# Patient Record
Sex: FEMALE | Race: BLACK OR AFRICAN AMERICAN | NOT HISPANIC OR LATINO | Employment: UNEMPLOYED | URBAN - NONMETROPOLITAN AREA
[De-identification: names, ages, dates, MRNs, and addresses within clinical notes are randomized per-mention and may not be internally consistent; named-entity substitution may affect disease eponyms.]

---

## 2017-04-05 ENCOUNTER — OFFICE VISIT (OUTPATIENT)
Dept: SURGERY | Facility: CLINIC | Age: 32
End: 2017-04-05

## 2017-04-05 VITALS
BODY MASS INDEX: 51.91 KG/M2 | RESPIRATION RATE: 18 BRPM | DIASTOLIC BLOOD PRESSURE: 100 MMHG | HEIGHT: 63 IN | TEMPERATURE: 99.1 F | WEIGHT: 293 LBS | SYSTOLIC BLOOD PRESSURE: 160 MMHG | HEART RATE: 102 BPM

## 2017-04-05 DIAGNOSIS — K80.20 GALLSTONES: Primary | ICD-10-CM

## 2017-04-05 PROCEDURE — 99203 OFFICE O/P NEW LOW 30 MIN: CPT | Performed by: SURGERY

## 2017-04-05 RX ORDER — SERTRALINE HYDROCHLORIDE 25 MG/1
50 TABLET, FILM COATED ORAL DAILY
COMMUNITY
End: 2017-05-04 | Stop reason: SDUPTHER

## 2017-04-05 RX ORDER — BUSPIRONE HYDROCHLORIDE 10 MG/1
10 TABLET ORAL 3 TIMES DAILY
COMMUNITY

## 2017-04-05 RX ORDER — GABAPENTIN 300 MG/1
300 CAPSULE ORAL 3 TIMES DAILY
COMMUNITY
End: 2017-04-10

## 2017-04-05 RX ORDER — CYCLOBENZAPRINE HCL 5 MG
10 TABLET ORAL 3 TIMES DAILY PRN
COMMUNITY
End: 2017-05-04

## 2017-04-05 RX ORDER — HEPARIN SODIUM 5000 [USP'U]/ML
5000 INJECTION, SOLUTION INTRAVENOUS; SUBCUTANEOUS ONCE
Status: CANCELLED | OUTPATIENT
Start: 2017-04-05 | End: 2017-04-05

## 2017-04-05 RX ORDER — NORGESTIMATE AND ETHINYL ESTRADIOL 0.25-0.035
1 KIT ORAL DAILY
COMMUNITY
End: 2021-08-12

## 2017-04-05 RX ORDER — SODIUM CHLORIDE 9 MG/ML
50 INJECTION, SOLUTION INTRAVENOUS CONTINUOUS
Status: CANCELLED | OUTPATIENT
Start: 2017-04-05

## 2017-04-05 RX ORDER — SODIUM CHLORIDE 0.9 % (FLUSH) 0.9 %
1-10 SYRINGE (ML) INJECTION AS NEEDED
Status: CANCELLED | OUTPATIENT
Start: 2017-04-05

## 2017-04-05 RX ORDER — LISINOPRIL AND HYDROCHLOROTHIAZIDE 12.5; 1 MG/1; MG/1
1 TABLET ORAL DAILY
Status: ON HOLD | COMMUNITY
End: 2017-04-20

## 2017-04-05 NOTE — PROGRESS NOTES
Subjective   Lynda Biswas is a 31 y.o. female.   Chief Complaint   Patient presents with   • Abdominal Pain     Pt states needs gallbladder removed. Dr. Hoffman referred.     History of Present Illness   Lynda Biswas is a 31-year-old female patient who presents today for evaluation of gallstones.  She reports a several month history of postprandial right upper quadrant abdominal pain with radiation to the epigastrium.  There is associated nausea and diarrhea but no vomiting.  She denies jaundice, acholic stools, or dark urine.  This has consistently been associated with high fat foods.  Ultimately, this led her to be evaluated in the emergency department at [facility] where a right upper quadrant ultrasound and a CT scan and pelvis were performed.  These studies demonstrated gallstones with gallbladder sludge within a otherwise normal-appearing gallbladder.  She was also noted to have a fatty liver.  She then followed up with her primary care provider and was subsequently referred for surgical evaluation.  Her only past surgical history is a laparoscopic appendectomy.  She reports no adverse reactions to anesthesia in the past.        Past Medical History:   Diagnosis Date   • Arthritis    • Depression    • Diabetes    • Gall stones    • Hypertension    • PEDRO on CPAP    • PCOS (polycystic ovarian syndrome)        Past Surgical History:   Procedure Laterality Date   • APPENDECTOMY           Current Outpatient Prescriptions:   •  busPIRone (BUSPAR) 5 MG tablet, Take 5 mg by mouth 3 (Three) Times a Day., Disp: , Rfl:   •  cyclobenzaprine (FLEXERIL) 5 MG tablet, Take 5 mg by mouth 3 (Three) Times a Day As Needed for Muscle Spasms., Disp: , Rfl:   •  gabapentin (NEURONTIN) 300 MG capsule, Take 300 mg by mouth 3 (Three) Times a Day., Disp: , Rfl:   •  lisinopril-hydrochlorothiazide (PRINZIDE,ZESTORETIC) 10-12.5 MG per tablet, Take 1 tablet by mouth Daily., Disp: , Rfl:   •  metFORMIN (GLUCOPHAGE) 500 MG  "tablet, Take 500 mg by mouth 2 (Two) Times a Day With Meals., Disp: , Rfl:   •  norgestimate-ethinyl estradiol (SPRINTEC 28) 0.25-35 MG-MCG per tablet, Take 1 package by mouth Daily., Disp: , Rfl:   •  sertraline (ZOLOFT) 25 MG tablet, Take 25 mg by mouth Daily., Disp: , Rfl:       No Known Allergies      Family History   Problem Relation Age of Onset   • Arthritis Other    • Cancer Other    • Diabetes Other    • Hypertension Other    • Kidney disease Other    • Obesity Other        Social History     Social History   • Marital status: Single     Spouse name: N/A   • Number of children: N/A   • Years of education: N/A     Occupational History   • Not on file.     Social History Main Topics   • Smoking status: Current Every Day Smoker     Types: Cigarettes   • Smokeless tobacco: Never Used   • Alcohol use Yes      Comment: occassionally   • Drug use: No   • Sexual activity: Defer     Other Topics Concern   • Not on file     Social History Narrative   • No narrative on file         Review of Systems   Constitutional:        Feeling poorly   HENT: Negative.    Eyes: Negative.    Respiratory: Negative.    Cardiovascular: Positive for leg swelling.        Swollen ankles.   Gastrointestinal: Positive for abdominal pain, diarrhea, nausea and vomiting.   Endocrine: Positive for heat intolerance.        Muscle weakness.   Genitourinary: Negative.    Musculoskeletal: Positive for arthralgias.   Skin: Negative.    Allergic/Immunologic: Negative.    Neurological: Positive for numbness.   Hematological: Negative.    Psychiatric/Behavioral: Negative.        Objective    /100  Pulse 102  Temp 99.1 °F (37.3 °C) (Temporal Artery )   Resp 18  Ht 63\" (160 cm)  Wt (!) 350 lb (159 kg)  BMI 62 kg/m2    Physical Exam   Constitutional: She is oriented to person, place, and time. She appears well-developed.   Morbidly obese   HENT:   Head: Normocephalic and atraumatic.   Eyes: No scleral icterus.   Neck: Neck supple. "   Cardiovascular: Regular rhythm.    Pulmonary/Chest: Effort normal.   Abdominal: Soft. She exhibits no distension. There is no tenderness.   Neurological: She is alert and oriented to person, place, and time.   Skin: Skin is warm and dry.   Psychiatric: She has a normal mood and affect. Her behavior is normal.       Results:  CT abdomen and pelvis report dated 12/29/16 personally reviewed.  Cholelithiasis versus gallbladder sludge, fatty liver, otherwise unremarkable.    Right upper quadrant ultrasound report dated 12/29/16 personally reviewed.  Cholelithiasis.  Gallbladder otherwise normal.  Fatty liver.        Assessment/Plan   Lynda was seen today for abdominal pain.    Diagnoses and all orders for this visit:    Gallstones  -     Case Request; Standing  -     sodium chloride 0.9 % flush 1-10 mL; Infuse 1-10 mL into a venous catheter As Needed for Line Care.  -     ampicillin-sulbactam (UNASYN) 3 g in sodium chloride 0.9 % 100 mL IVPB; Infuse 3 g into a venous catheter 1 (One) Time.  -     heparin (porcine) 5000 UNIT/ML injection 5,000 Units; Inject 1 mL under the skin 1 (One) Time.  -     sodium chloride 0.9 % infusion; Infuse 50 mL/hr into a venous catheter Continuous.  -     Case Request    Other orders  -     Follow Anesthesia Guidelines / Standing Orders; Future  -     Provide NPO Instructions to Patient  -     Clorhexidine Skin Prep  -     Follow Anesthesia Guidelines / Standing Orders; Standing  -     Verify NPO Status; Standing  -     Verify Informed Consent; Standing  -     SCD (Sequential Compression Device) - Place on Patient in Pre-Op; Standing  -     Insert Peripheral IV; Standing  -     Saline Lock & Maintain IV Access; Standing  -     Obtain Informed Consent      I discussed the nature of gallbladder disease with the patient detail. I discussed the operation of cholecystectomy in detail and the complications related to the operation and the anesthesia including MI, CVA, sudden death, reaction to  anesthetic medications, bile duct injury, postoperative bile leak, infection, bleeding, DVT, and the need for conversion to an open procedure should.  A detailed discussion regarding the benefits and alternatives to surgery was also undertaken with the patient.The patient wishes to proceed with surgery as discussed. Informed consent for the procedure was signed in the office today. The patient was advised to remain NPO after midnight the evening prior to surgery.  Detailed written medication instructions were provided in the after visit summary.   The patient was advised that my office will make arrangements for scheduling and preadmission testing.             The patient's JOSE CARLOS report was obtained, reviewed by me and scanned into the medical record.

## 2017-04-10 ENCOUNTER — APPOINTMENT (OUTPATIENT)
Dept: PREADMISSION TESTING | Facility: HOSPITAL | Age: 32
End: 2017-04-10

## 2017-04-10 VITALS — WEIGHT: 293 LBS | BODY MASS INDEX: 51.91 KG/M2 | HEIGHT: 63 IN

## 2017-04-10 LAB
ANION GAP SERPL CALCULATED.3IONS-SCNC: 13.5 MMOL/L
BUN BLD-MCNC: 13 MG/DL (ref 7–20)
BUN/CREAT SERPL: 18.6 (ref 7.1–23.5)
CALCIUM SPEC-SCNC: 9.4 MG/DL (ref 8.4–10.2)
CHLORIDE SERPL-SCNC: 105 MMOL/L (ref 98–107)
CO2 SERPL-SCNC: 27 MMOL/L (ref 26–30)
CREAT BLD-MCNC: 0.7 MG/DL (ref 0.6–1.3)
DEPRECATED RDW RBC AUTO: 45.1 FL (ref 37–54)
ERYTHROCYTE [DISTWIDTH] IN BLOOD BY AUTOMATED COUNT: 15.4 % (ref 11.5–14.5)
GFR SERPL CREATININE-BSD FRML MDRD: 118 ML/MIN/1.73
GLUCOSE BLD-MCNC: 119 MG/DL (ref 74–98)
HCT VFR BLD AUTO: 43 % (ref 37–47)
HGB BLD-MCNC: 13 G/DL (ref 12–16)
MCH RBC QN AUTO: 24.4 PG (ref 27–31)
MCHC RBC AUTO-ENTMCNC: 30.2 G/DL (ref 30–37)
MCV RBC AUTO: 80.7 FL (ref 81–99)
PLATELET # BLD AUTO: 324 10*3/MM3 (ref 130–400)
PMV BLD AUTO: 9.8 FL (ref 6–12)
POTASSIUM BLD-SCNC: 4.5 MMOL/L (ref 3.5–5.1)
RBC # BLD AUTO: 5.33 10*6/MM3 (ref 4.2–5.4)
SODIUM BLD-SCNC: 141 MMOL/L (ref 137–145)
WBC NRBC COR # BLD: 9.19 10*3/MM3 (ref 4.8–10.8)

## 2017-04-10 PROCEDURE — 80048 BASIC METABOLIC PNL TOTAL CA: CPT | Performed by: SURGERY

## 2017-04-10 PROCEDURE — 85027 COMPLETE CBC AUTOMATED: CPT | Performed by: SURGERY

## 2017-04-10 PROCEDURE — 93005 ELECTROCARDIOGRAM TRACING: CPT | Performed by: SURGERY

## 2017-04-10 PROCEDURE — 36415 COLL VENOUS BLD VENIPUNCTURE: CPT

## 2017-04-10 RX ORDER — GABAPENTIN 300 MG/1
300 CAPSULE ORAL 3 TIMES DAILY
COMMUNITY
End: 2018-08-08

## 2017-04-17 ENCOUNTER — APPOINTMENT (OUTPATIENT)
Dept: GENERAL RADIOLOGY | Facility: HOSPITAL | Age: 32
End: 2017-04-17

## 2017-04-17 ENCOUNTER — ANESTHESIA EVENT (OUTPATIENT)
Dept: PERIOP | Facility: HOSPITAL | Age: 32
End: 2017-04-17

## 2017-04-17 ENCOUNTER — HOSPITAL ENCOUNTER (INPATIENT)
Facility: HOSPITAL | Age: 32
LOS: 6 days | Discharge: HOME | End: 2017-04-23
Attending: SURGERY | Admitting: SURGERY

## 2017-04-17 ENCOUNTER — ANESTHESIA (OUTPATIENT)
Dept: PERIOP | Facility: HOSPITAL | Age: 32
End: 2017-04-17

## 2017-04-17 DIAGNOSIS — K80.20 GALLSTONES: ICD-10-CM

## 2017-04-17 LAB
ARTERIAL PATENCY WRIST A: POSITIVE
ATMOSPHERIC PRESS: 737 MMHG
B-HCG UR QL: NEGATIVE
BASE EXCESS BLDA CALC-SCNC: -3.7 MMOL/L
BDY SITE: ABNORMAL
GLUCOSE BLDC GLUCOMTR-MCNC: 149 MG/DL (ref 70–130)
GLUCOSE BLDC GLUCOMTR-MCNC: 95 MG/DL (ref 70–130)
HCO3 BLDA-SCNC: 21.7 MMOL/L (ref 22–28)
HGB BLDA-MCNC: 13.6 G/DL (ref 12–18)
HOROWITZ INDEX BLD+IHG-RTO: 50 %
MODALITY: ABNORMAL
PCO2 BLDA: 38.1 MM HG (ref 35–45)
PH BLDA: 7.36 PH UNITS
PO2 BLDA: 92.9 MM HG (ref 75–100)
SAO2 % BLDCOA: 97.9 %

## 2017-04-17 PROCEDURE — 47605 CHOLECYSTECTOMY W/CHOLANG: CPT | Performed by: SURGERY

## 2017-04-17 PROCEDURE — 25010000002 PROPOFOL 200 MG/20ML EMULSION: Performed by: NURSE ANESTHETIST, CERTIFIED REGISTERED

## 2017-04-17 PROCEDURE — 74000 HC ABDOMEN KUB: CPT

## 2017-04-17 PROCEDURE — 87186 SC STD MICRODIL/AGAR DIL: CPT | Performed by: SURGERY

## 2017-04-17 PROCEDURE — 87077 CULTURE AEROBIC IDENTIFY: CPT | Performed by: SURGERY

## 2017-04-17 PROCEDURE — 25010000003 AMPICILLIN-SULBACTAM PER 1.5 G: Performed by: SURGERY

## 2017-04-17 PROCEDURE — 25010000002 FENTANYL CITRATE (PF) 100 MCG/2ML SOLUTION: Performed by: NURSE ANESTHETIST, CERTIFIED REGISTERED

## 2017-04-17 PROCEDURE — 71010 HC CHEST PA OR AP: CPT

## 2017-04-17 PROCEDURE — 74300 X-RAY BILE DUCTS/PANCREAS: CPT

## 2017-04-17 PROCEDURE — 81025 URINE PREGNANCY TEST: CPT | Performed by: SURGERY

## 2017-04-17 PROCEDURE — 87205 SMEAR GRAM STAIN: CPT | Performed by: SURGERY

## 2017-04-17 PROCEDURE — 25010000002 PHENYLEPHRINE PER 1 ML: Performed by: NURSE ANESTHETIST, CERTIFIED REGISTERED

## 2017-04-17 PROCEDURE — 94002 VENT MGMT INPAT INIT DAY: CPT

## 2017-04-17 PROCEDURE — 25010000002 HEPARIN (PORCINE) PER 1000 UNITS

## 2017-04-17 PROCEDURE — 25010000002 DEXAMETHASONE PER 1 MG: Performed by: NURSE ANESTHETIST, CERTIFIED REGISTERED

## 2017-04-17 PROCEDURE — 0 IOPAMIDOL 61 % SOLUTION: Performed by: SURGERY

## 2017-04-17 PROCEDURE — 0FT40ZZ RESECTION OF GALLBLADDER, OPEN APPROACH: ICD-10-PCS | Performed by: SURGERY

## 2017-04-17 PROCEDURE — 25010000002 HEPARIN (PORCINE) PER 1000 UNITS: Performed by: SURGERY

## 2017-04-17 PROCEDURE — 82805 BLOOD GASES W/O2 SATURATION: CPT

## 2017-04-17 PROCEDURE — 82962 GLUCOSE BLOOD TEST: CPT

## 2017-04-17 PROCEDURE — 25010000002 ONDANSETRON PER 1 MG

## 2017-04-17 PROCEDURE — 25010000002 ONDANSETRON PER 1 MG: Performed by: NURSE ANESTHETIST, CERTIFIED REGISTERED

## 2017-04-17 PROCEDURE — 87147 CULTURE TYPE IMMUNOLOGIC: CPT | Performed by: SURGERY

## 2017-04-17 PROCEDURE — 94799 UNLISTED PULMONARY SVC/PX: CPT

## 2017-04-17 PROCEDURE — 36600 WITHDRAWAL OF ARTERIAL BLOOD: CPT

## 2017-04-17 PROCEDURE — 87081 CULTURE SCREEN ONLY: CPT | Performed by: SURGERY

## 2017-04-17 PROCEDURE — BF101ZZ FLUOROSCOPY OF BILE DUCTS USING LOW OSMOLAR CONTRAST: ICD-10-PCS | Performed by: SURGERY

## 2017-04-17 PROCEDURE — 25010000002 MORPHINE PER 10 MG: Performed by: SURGERY

## 2017-04-17 PROCEDURE — 25010000002 SUCCINYLCHOLINE PER 20 MG: Performed by: NURSE ANESTHETIST, CERTIFIED REGISTERED

## 2017-04-17 PROCEDURE — 25010000002 MIDAZOLAM PER 1 MG: Performed by: NURSE ANESTHETIST, CERTIFIED REGISTERED

## 2017-04-17 PROCEDURE — 25010000002 PROPOFOL 1000 MG/ML EMULSION: Performed by: SURGERY

## 2017-04-17 RX ORDER — ROCURONIUM BROMIDE 10 MG/ML
INJECTION, SOLUTION INTRAVENOUS AS NEEDED
Status: DISCONTINUED | OUTPATIENT
Start: 2017-04-17 | End: 2017-04-17 | Stop reason: SURG

## 2017-04-17 RX ORDER — DEXAMETHASONE SODIUM PHOSPHATE 4 MG/ML
INJECTION, SOLUTION INTRA-ARTICULAR; INTRALESIONAL; INTRAMUSCULAR; INTRAVENOUS; SOFT TISSUE AS NEEDED
Status: DISCONTINUED | OUTPATIENT
Start: 2017-04-17 | End: 2017-04-17 | Stop reason: SURG

## 2017-04-17 RX ORDER — FENTANYL CITRATE 50 UG/ML
INJECTION, SOLUTION INTRAMUSCULAR; INTRAVENOUS AS NEEDED
Status: DISCONTINUED | OUTPATIENT
Start: 2017-04-17 | End: 2017-04-17 | Stop reason: SURG

## 2017-04-17 RX ORDER — MORPHINE SULFATE 2 MG/ML
2 INJECTION, SOLUTION INTRAMUSCULAR; INTRAVENOUS
Status: DISCONTINUED | OUTPATIENT
Start: 2017-04-17 | End: 2017-04-19

## 2017-04-17 RX ORDER — SODIUM CHLORIDE 9 MG/ML
125 INJECTION, SOLUTION INTRAVENOUS CONTINUOUS
Status: DISCONTINUED | OUTPATIENT
Start: 2017-04-17 | End: 2017-04-18

## 2017-04-17 RX ORDER — SODIUM CHLORIDE 0.9 % (FLUSH) 0.9 %
1-10 SYRINGE (ML) INJECTION AS NEEDED
Status: DISCONTINUED | OUTPATIENT
Start: 2017-04-17 | End: 2017-04-17 | Stop reason: HOSPADM

## 2017-04-17 RX ORDER — GLYCOPYRROLATE 0.2 MG/ML
INJECTION INTRAMUSCULAR; INTRAVENOUS AS NEEDED
Status: DISCONTINUED | OUTPATIENT
Start: 2017-04-17 | End: 2017-04-17 | Stop reason: SURG

## 2017-04-17 RX ORDER — HEPARIN SODIUM 5000 [USP'U]/ML
INJECTION, SOLUTION INTRAVENOUS; SUBCUTANEOUS
Status: COMPLETED
Start: 2017-04-17 | End: 2017-04-17

## 2017-04-17 RX ORDER — CHLORHEXIDINE GLUCONATE 0.12 MG/ML
15 RINSE ORAL EVERY 12 HOURS SCHEDULED
Status: DISCONTINUED | OUTPATIENT
Start: 2017-04-17 | End: 2017-04-18

## 2017-04-17 RX ORDER — ONDANSETRON 2 MG/ML
4 INJECTION INTRAMUSCULAR; INTRAVENOUS EVERY 6 HOURS PRN
Status: DISCONTINUED | OUTPATIENT
Start: 2017-04-17 | End: 2017-04-23 | Stop reason: HOSPADM

## 2017-04-17 RX ORDER — PROPOFOL 10 MG/ML
INJECTION, EMULSION INTRAVENOUS AS NEEDED
Status: DISCONTINUED | OUTPATIENT
Start: 2017-04-17 | End: 2017-04-17 | Stop reason: SURG

## 2017-04-17 RX ORDER — ONDANSETRON 2 MG/ML
INJECTION INTRAMUSCULAR; INTRAVENOUS AS NEEDED
Status: DISCONTINUED | OUTPATIENT
Start: 2017-04-17 | End: 2017-04-17 | Stop reason: SURG

## 2017-04-17 RX ORDER — ONDANSETRON 2 MG/ML
INJECTION INTRAMUSCULAR; INTRAVENOUS
Status: COMPLETED
Start: 2017-04-17 | End: 2017-04-17

## 2017-04-17 RX ORDER — GABAPENTIN 300 MG/1
600 CAPSULE ORAL ONCE
Status: COMPLETED | OUTPATIENT
Start: 2017-04-17 | End: 2017-04-17

## 2017-04-17 RX ORDER — MIDAZOLAM HYDROCHLORIDE 1 MG/ML
INJECTION INTRAMUSCULAR; INTRAVENOUS AS NEEDED
Status: DISCONTINUED | OUTPATIENT
Start: 2017-04-17 | End: 2017-04-17 | Stop reason: SURG

## 2017-04-17 RX ORDER — ACETAMINOPHEN 325 MG/1
TABLET ORAL
Status: COMPLETED
Start: 2017-04-17 | End: 2017-04-17

## 2017-04-17 RX ORDER — SODIUM CHLORIDE 0.9 % (FLUSH) 0.9 %
1-10 SYRINGE (ML) INJECTION AS NEEDED
Status: DISCONTINUED | OUTPATIENT
Start: 2017-04-17 | End: 2017-04-23 | Stop reason: HOSPADM

## 2017-04-17 RX ORDER — FAMOTIDINE 10 MG/ML
INJECTION, SOLUTION INTRAVENOUS
Status: COMPLETED
Start: 2017-04-17 | End: 2017-04-17

## 2017-04-17 RX ORDER — HEPARIN SODIUM 5000 [USP'U]/ML
7500 INJECTION, SOLUTION INTRAVENOUS; SUBCUTANEOUS EVERY 8 HOURS SCHEDULED
Status: DISCONTINUED | OUTPATIENT
Start: 2017-04-17 | End: 2017-04-23 | Stop reason: HOSPADM

## 2017-04-17 RX ORDER — ACETAMINOPHEN 325 MG/1
975 TABLET ORAL ONCE
Status: COMPLETED | OUTPATIENT
Start: 2017-04-17 | End: 2017-04-17

## 2017-04-17 RX ORDER — SODIUM CHLORIDE 9 MG/ML
50 INJECTION, SOLUTION INTRAVENOUS CONTINUOUS
Status: DISCONTINUED | OUTPATIENT
Start: 2017-04-17 | End: 2017-04-17

## 2017-04-17 RX ORDER — SUCCINYLCHOLINE CHLORIDE 20 MG/ML
INJECTION INTRAMUSCULAR; INTRAVENOUS AS NEEDED
Status: DISCONTINUED | OUTPATIENT
Start: 2017-04-17 | End: 2017-04-17 | Stop reason: SURG

## 2017-04-17 RX ORDER — LIDOCAINE HYDROCHLORIDE 10 MG/ML
INJECTION, SOLUTION INFILTRATION; PERINEURAL AS NEEDED
Status: DISCONTINUED | OUTPATIENT
Start: 2017-04-17 | End: 2017-04-17 | Stop reason: HOSPADM

## 2017-04-17 RX ORDER — SODIUM CHLORIDE 9 MG/ML
INJECTION, SOLUTION INTRAVENOUS CONTINUOUS PRN
Status: DISCONTINUED | OUTPATIENT
Start: 2017-04-17 | End: 2017-04-17 | Stop reason: SURG

## 2017-04-17 RX ORDER — ONDANSETRON 2 MG/ML
4 INJECTION INTRAMUSCULAR; INTRAVENOUS ONCE
Status: COMPLETED | OUTPATIENT
Start: 2017-04-17 | End: 2017-04-17

## 2017-04-17 RX ORDER — GABAPENTIN 300 MG/1
CAPSULE ORAL
Status: COMPLETED
Start: 2017-04-17 | End: 2017-04-17

## 2017-04-17 RX ORDER — BUPIVACAINE HYDROCHLORIDE 2.5 MG/ML
INJECTION, SOLUTION EPIDURAL; INFILTRATION; INTRACAUDAL
Status: DISPENSED
Start: 2017-04-17 | End: 2017-04-18

## 2017-04-17 RX ORDER — HEPARIN SODIUM 5000 [USP'U]/ML
5000 INJECTION, SOLUTION INTRAVENOUS; SUBCUTANEOUS ONCE
Status: COMPLETED | OUTPATIENT
Start: 2017-04-17 | End: 2017-04-17

## 2017-04-17 RX ORDER — LIDOCAINE HYDROCHLORIDE 10 MG/ML
INJECTION, SOLUTION INFILTRATION; PERINEURAL
Status: DISPENSED
Start: 2017-04-17 | End: 2017-04-18

## 2017-04-17 RX ORDER — DEXTROSE MONOHYDRATE 25 G/50ML
25 INJECTION, SOLUTION INTRAVENOUS
Status: DISCONTINUED | OUTPATIENT
Start: 2017-04-17 | End: 2017-04-23 | Stop reason: HOSPADM

## 2017-04-17 RX ORDER — PANTOPRAZOLE SODIUM 40 MG/10ML
40 INJECTION, POWDER, LYOPHILIZED, FOR SOLUTION INTRAVENOUS
Status: DISCONTINUED | OUTPATIENT
Start: 2017-04-18 | End: 2017-04-22

## 2017-04-17 RX ADMIN — ROCURONIUM BROMIDE 20 MG: 10 INJECTION INTRAVENOUS at 16:10

## 2017-04-17 RX ADMIN — LIDOCAINE HYDROCHLORIDE 60 MG: 20 INJECTION, SOLUTION INTRAVENOUS at 14:48

## 2017-04-17 RX ADMIN — MORPHINE SULFATE 2 MG: 2 INJECTION, SOLUTION INTRAMUSCULAR; INTRAVENOUS at 22:57

## 2017-04-17 RX ADMIN — ROCURONIUM BROMIDE 20 MG: 10 INJECTION INTRAVENOUS at 14:55

## 2017-04-17 RX ADMIN — Medication 100 MCG: at 15:25

## 2017-04-17 RX ADMIN — SODIUM CHLORIDE 50 ML/HR: 9 INJECTION, SOLUTION INTRAVENOUS at 19:55

## 2017-04-17 RX ADMIN — Medication 10 ML: at 22:59

## 2017-04-17 RX ADMIN — PROPOFOL 50 MCG/KG/MIN: 10 INJECTION, EMULSION INTRAVENOUS at 23:45

## 2017-04-17 RX ADMIN — MIDAZOLAM HYDROCHLORIDE 2 MG: 1 INJECTION, SOLUTION INTRAMUSCULAR; INTRAVENOUS at 17:50

## 2017-04-17 RX ADMIN — MIDAZOLAM HYDROCHLORIDE 1 MG: 1 INJECTION, SOLUTION INTRAMUSCULAR; INTRAVENOUS at 17:53

## 2017-04-17 RX ADMIN — FAMOTIDINE 20 MG: 10 INJECTION, SOLUTION INTRAVENOUS at 11:05

## 2017-04-17 RX ADMIN — GABAPENTIN 600 MG: 300 CAPSULE ORAL at 11:05

## 2017-04-17 RX ADMIN — MORPHINE SULFATE 2 MG: 2 INJECTION, SOLUTION INTRAMUSCULAR; INTRAVENOUS at 21:32

## 2017-04-17 RX ADMIN — PROPOFOL 60 MCG/KG/MIN: 10 INJECTION, EMULSION INTRAVENOUS at 21:49

## 2017-04-17 RX ADMIN — FENTANYL CITRATE 50 MCG: 50 INJECTION, SOLUTION INTRAMUSCULAR; INTRAVENOUS at 18:01

## 2017-04-17 RX ADMIN — ONDANSETRON 4 MG: 2 INJECTION INTRAMUSCULAR; INTRAVENOUS at 11:03

## 2017-04-17 RX ADMIN — FENTANYL CITRATE 50 MCG: 50 INJECTION, SOLUTION INTRAMUSCULAR; INTRAVENOUS at 17:27

## 2017-04-17 RX ADMIN — HEPARIN SODIUM 5000 UNITS: 5000 INJECTION, SOLUTION INTRAVENOUS; SUBCUTANEOUS at 14:37

## 2017-04-17 RX ADMIN — FENTANYL CITRATE 50 MCG: 50 INJECTION, SOLUTION INTRAMUSCULAR; INTRAVENOUS at 17:15

## 2017-04-17 RX ADMIN — PROPOFOL 200 MG: 10 INJECTION, EMULSION INTRAVENOUS at 14:48

## 2017-04-17 RX ADMIN — Medication 100 MCG: at 15:17

## 2017-04-17 RX ADMIN — SODIUM CHLORIDE 50 ML/HR: 9 INJECTION, SOLUTION INTRAVENOUS at 10:48

## 2017-04-17 RX ADMIN — SODIUM CHLORIDE 3 G: 9 INJECTION, SOLUTION INTRAVENOUS at 14:54

## 2017-04-17 RX ADMIN — PHENYLEPHRINE HYDROCHLORIDE 0.25 MCG/KG/MIN: 10 INJECTION INTRAVENOUS at 15:38

## 2017-04-17 RX ADMIN — ROCURONIUM BROMIDE 20 MG: 10 INJECTION INTRAVENOUS at 15:15

## 2017-04-17 RX ADMIN — SUCCINYLCHOLINE CHLORIDE 200 MG: 20 INJECTION, SOLUTION INTRAMUSCULAR; INTRAVENOUS at 14:48

## 2017-04-17 RX ADMIN — SODIUM CHLORIDE 125 ML/HR: 9 INJECTION, SOLUTION INTRAVENOUS at 22:59

## 2017-04-17 RX ADMIN — Medication 100 MCG: at 15:37

## 2017-04-17 RX ADMIN — FENTANYL CITRATE 50 MCG: 50 INJECTION, SOLUTION INTRAMUSCULAR; INTRAVENOUS at 17:50

## 2017-04-17 RX ADMIN — FENTANYL CITRATE 100 MCG: 50 INJECTION, SOLUTION INTRAMUSCULAR; INTRAVENOUS at 14:48

## 2017-04-17 RX ADMIN — Medication 100 MCG: at 15:12

## 2017-04-17 RX ADMIN — ONDANSETRON 4 MG: 2 INJECTION INTRAMUSCULAR; INTRAVENOUS at 16:18

## 2017-04-17 RX ADMIN — DEXAMETHASONE SODIUM PHOSPHATE 10 MG: 4 INJECTION, SOLUTION INTRAMUSCULAR; INTRAVENOUS at 14:55

## 2017-04-17 RX ADMIN — HEPARIN SODIUM 7500 UNITS: 5000 INJECTION, SOLUTION INTRAVENOUS; SUBCUTANEOUS at 22:58

## 2017-04-17 RX ADMIN — PROPOFOL 5 MCG/KG/MIN: 10 INJECTION, EMULSION INTRAVENOUS at 19:30

## 2017-04-17 RX ADMIN — MIDAZOLAM HYDROCHLORIDE 2 MG: 1 INJECTION, SOLUTION INTRAMUSCULAR; INTRAVENOUS at 17:41

## 2017-04-17 RX ADMIN — ACETAMINOPHEN 975 MG: 325 TABLET ORAL at 11:02

## 2017-04-17 RX ADMIN — ROCURONIUM BROMIDE 10 MG: 10 INJECTION INTRAVENOUS at 14:48

## 2017-04-17 RX ADMIN — Medication 100 MCG: at 15:21

## 2017-04-17 RX ADMIN — MIDAZOLAM HYDROCHLORIDE 2 MG: 1 INJECTION, SOLUTION INTRAMUSCULAR; INTRAVENOUS at 14:45

## 2017-04-17 RX ADMIN — ROCURONIUM BROMIDE 20 MG: 10 INJECTION INTRAVENOUS at 15:55

## 2017-04-17 RX ADMIN — ACETAMINOPHEN 975 MG: 325 TABLET, FILM COATED ORAL at 11:02

## 2017-04-17 RX ADMIN — SODIUM CHLORIDE: 9 INJECTION, SOLUTION INTRAVENOUS at 15:39

## 2017-04-17 RX ADMIN — GLYCOPYRROLATE 0.4 MG: 0.2 INJECTION, SOLUTION INTRAMUSCULAR; INTRAVENOUS at 14:48

## 2017-04-17 RX ADMIN — CHLORHEXIDINE GLUCONATE 15 ML: 1.2 RINSE ORAL at 22:57

## 2017-04-17 NOTE — ANESTHESIA PREPROCEDURE EVALUATION
Anesthesia Evaluation     Patient summary reviewed and Nursing notes reviewed   no history of anesthetic complications:  NPO Status: > 8 hours   Airway   Mallampati: II  TM distance: >3 FB  difficult intubation highly probable  Dental    (+) poor dentition    Pulmonary    (+) a smoker (1 cigar per month) Current, sleep apnea on CPAP,   Cardiovascular     (+) hypertension well controlled, PVD,       Neuro/Psych  (+) psychiatric history Anxiety and Depression,    GI/Hepatic/Renal/Endo    (+) morbid obesity (BMI 63), GERD well controlled, liver disease, diabetes mellitus (FSBS 95. Boderline) type 2 well controlled,     Musculoskeletal (-) negative ROS    Abdominal   (+) obese,    Substance History   (+) alcohol use (Social),      OB/GYN          Other        ROS/Med Hx Other: +PCOD  B/L CTS                                Anesthesia Plan    ASA 4     general   (Risks and benefits discussed including risk of aspiration, recall and dental damage. All patient questions answered. Will continue with POC.    GETA)  intravenous induction   Anesthetic plan and risks discussed with patient.

## 2017-04-17 NOTE — ANESTHESIA PROCEDURE NOTES
Airway  Urgency: elective    Difficult airway    General Information and Staff    Patient location during procedure: OR  CRNA: SINDY EVANGELISTA    Indications and Patient Condition  Indications for airway management: airway protection    Preoxygenated: yes  MILS maintained throughout  Mask difficulty assessment: 3 - difficult mask (inadequate, unstable or two providers) +/- NMBA (two providers for mask ventilation)    Final Airway Details  Final airway type: endotracheal airway      Successful airway: ETT  Cuffed: yes   Successful intubation technique: video laryngoscopy  Facilitating devices/methods: intubating stylet and cricoid pressure  Endotracheal tube insertion site: oral  Blade size: #4  ETT size: 7.5 mm  Cormack-Lehane Classification: grade IIb - view of arytenoids or posterior of glottis only (very small glottic airway, a lot of redundant tissue)  Placement verified by: chest auscultation and capnometry   Cuff volume (mL): 8  Measured from: lips  ETT to lips (cm): 21  Number of attempts at approach: 2    Additional Comments  Attempt number one with Glidescope unable to pass tube, pt repositioned and mask ventilated, attempt number two with glidescope. Large tongue and redundant tissue made intubation difficult. Teeth and soft tissue as pre-op

## 2017-04-18 LAB
ALBUMIN SERPL-MCNC: 3.9 G/DL (ref 3.5–5)
ALBUMIN/GLOB SERPL: 1 G/DL (ref 1–2)
ALP SERPL-CCNC: 62 U/L (ref 38–126)
ALT SERPL W P-5'-P-CCNC: 113 U/L (ref 13–69)
ANION GAP SERPL CALCULATED.3IONS-SCNC: 15.2 MMOL/L
ARTERIAL PATENCY WRIST A: POSITIVE
ARTERIAL PATENCY WRIST A: POSITIVE
AST SERPL-CCNC: 135 U/L (ref 15–46)
ATMOSPHERIC PRESS: 738 MMHG
ATMOSPHERIC PRESS: 739 MMHG
BASE EXCESS BLDA CALC-SCNC: -2 MMOL/L
BASE EXCESS BLDA CALC-SCNC: -2 MMOL/L
BASOPHILS # BLD AUTO: 0.03 10*3/MM3 (ref 0–0.2)
BASOPHILS NFR BLD AUTO: 0.2 % (ref 0–2.5)
BDY SITE: ABNORMAL
BDY SITE: ABNORMAL
BILIRUB SERPL-MCNC: 0.5 MG/DL (ref 0.2–1.3)
BILIRUB UR QL STRIP: NEGATIVE
BUN BLD-MCNC: 9 MG/DL (ref 7–20)
BUN/CREAT SERPL: 15 (ref 7.1–23.5)
CALCIUM SPEC-SCNC: 8.8 MG/DL (ref 8.4–10.2)
CHLORIDE SERPL-SCNC: 106 MMOL/L (ref 98–107)
CLARITY UR: CLEAR
CO2 SERPL-SCNC: 21 MMOL/L (ref 26–30)
COLOR UR: YELLOW
CREAT BLD-MCNC: 0.6 MG/DL (ref 0.6–1.3)
DEPRECATED RDW RBC AUTO: 44.1 FL (ref 37–54)
EOSINOPHIL # BLD AUTO: 0 10*3/MM3 (ref 0–0.7)
EOSINOPHIL NFR BLD AUTO: 0 % (ref 0–7)
ERYTHROCYTE [DISTWIDTH] IN BLOOD BY AUTOMATED COUNT: 15.7 % (ref 11.5–14.5)
GFR SERPL CREATININE-BSD FRML MDRD: 140 ML/MIN/1.73
GLOBULIN UR ELPH-MCNC: 4.1 GM/DL
GLUCOSE BLD-MCNC: 141 MG/DL (ref 74–98)
GLUCOSE BLDC GLUCOMTR-MCNC: 130 MG/DL (ref 70–130)
GLUCOSE BLDC GLUCOMTR-MCNC: 133 MG/DL (ref 70–130)
GLUCOSE BLDC GLUCOMTR-MCNC: 95 MG/DL (ref 70–130)
GLUCOSE UR STRIP-MCNC: NEGATIVE MG/DL
HBA1C MFR BLD: 6.6 % (ref 3–6)
HCO3 BLDA-SCNC: 21.9 MMOL/L (ref 22–28)
HCO3 BLDA-SCNC: 22.2 MMOL/L (ref 22–28)
HCT VFR BLD AUTO: 41.6 % (ref 37–47)
HGB BLD-MCNC: 12.7 G/DL (ref 12–16)
HGB BLDA-MCNC: 13.2 G/DL (ref 12–18)
HGB BLDA-MCNC: 13.3 G/DL (ref 12–18)
HGB UR QL STRIP.AUTO: NEGATIVE
HOROWITZ INDEX BLD+IHG-RTO: 40 %
HOROWITZ INDEX BLD+IHG-RTO: 50 %
IMM GRANULOCYTES # BLD: 0.21 10*3/MM3 (ref 0–0.06)
IMM GRANULOCYTES NFR BLD: 1.1 % (ref 0–0.6)
KETONES UR QL STRIP: ABNORMAL
LEUKOCYTE ESTERASE UR QL STRIP.AUTO: NEGATIVE
LYMPHOCYTES # BLD AUTO: 1.14 10*3/MM3 (ref 0.6–3.4)
LYMPHOCYTES NFR BLD AUTO: 5.7 % (ref 10–50)
MCH RBC QN AUTO: 24.2 PG (ref 27–31)
MCHC RBC AUTO-ENTMCNC: 30.5 G/DL (ref 30–37)
MCV RBC AUTO: 79.2 FL (ref 81–99)
MODALITY: ABNORMAL
MODALITY: ABNORMAL
MONOCYTES # BLD AUTO: 0.94 10*3/MM3 (ref 0–0.9)
MONOCYTES NFR BLD AUTO: 4.7 % (ref 0–12)
NEUTROPHILS # BLD AUTO: 17.64 10*3/MM3 (ref 2–6.9)
NEUTROPHILS NFR BLD AUTO: 88.3 % (ref 37–80)
NITRITE UR QL STRIP: NEGATIVE
NRBC BLD MANUAL-RTO: 0 /100 WBC (ref 0–0)
PCO2 BLDA: 30.9 MM HG (ref 35–45)
PCO2 BLDA: 33.5 MM HG (ref 35–45)
PH BLDA: 7.43 PH UNITS
PH BLDA: 7.46 PH UNITS
PH UR STRIP.AUTO: 5.5 [PH] (ref 5–8)
PLATELET # BLD AUTO: 353 10*3/MM3 (ref 130–400)
PMV BLD AUTO: 10.7 FL (ref 6–12)
PO2 BLDA: 104 MM HG (ref 75–100)
PO2 BLDA: 134 MM HG (ref 75–100)
POTASSIUM BLD-SCNC: 4.2 MMOL/L (ref 3.5–5.1)
PROT SERPL-MCNC: 8 G/DL (ref 6.3–8.2)
PROT UR QL STRIP: NEGATIVE
RBC # BLD AUTO: 5.25 10*6/MM3 (ref 4.2–5.4)
SAO2 % BLDCOA: 98.7 %
SAO2 % BLDCOA: 99.6 %
SODIUM BLD-SCNC: 138 MMOL/L (ref 137–145)
SP GR UR STRIP: 1.02 (ref 1–1.03)
UROBILINOGEN UR QL STRIP: ABNORMAL
WBC NRBC COR # BLD: 19.96 10*3/MM3 (ref 4.8–10.8)

## 2017-04-18 PROCEDURE — 25010000002 PROPOFOL 1000 MG/ML EMULSION: Performed by: SURGERY

## 2017-04-18 PROCEDURE — 81003 URINALYSIS AUTO W/O SCOPE: CPT | Performed by: SURGERY

## 2017-04-18 PROCEDURE — 82962 GLUCOSE BLOOD TEST: CPT

## 2017-04-18 PROCEDURE — 94799 UNLISTED PULMONARY SVC/PX: CPT

## 2017-04-18 PROCEDURE — 25010000002 MORPHINE PER 10 MG: Performed by: SURGERY

## 2017-04-18 PROCEDURE — 99254 IP/OBS CNSLTJ NEW/EST MOD 60: CPT | Performed by: INTERNAL MEDICINE

## 2017-04-18 PROCEDURE — 36600 WITHDRAWAL OF ARTERIAL BLOOD: CPT

## 2017-04-18 PROCEDURE — 80053 COMPREHEN METABOLIC PANEL: CPT | Performed by: SURGERY

## 2017-04-18 PROCEDURE — 99024 POSTOP FOLLOW-UP VISIT: CPT | Performed by: SURGERY

## 2017-04-18 PROCEDURE — 25010000003 AMPICILLIN-SULBACTAM PER 1.5 G: Performed by: SURGERY

## 2017-04-18 PROCEDURE — 25010000002 MORPHINE PER 10 MG: Performed by: INTERNAL MEDICINE

## 2017-04-18 PROCEDURE — 85025 COMPLETE CBC W/AUTO DIFF WBC: CPT | Performed by: SURGERY

## 2017-04-18 PROCEDURE — 82805 BLOOD GASES W/O2 SATURATION: CPT

## 2017-04-18 PROCEDURE — 25010000002 HEPARIN (PORCINE) PER 1000 UNITS: Performed by: SURGERY

## 2017-04-18 PROCEDURE — 25010000002 HYDRALAZINE PER 20 MG: Performed by: INTERNAL MEDICINE

## 2017-04-18 PROCEDURE — 94003 VENT MGMT INPAT SUBQ DAY: CPT

## 2017-04-18 PROCEDURE — 83036 HEMOGLOBIN GLYCOSYLATED A1C: CPT | Performed by: SURGERY

## 2017-04-18 RX ORDER — MORPHINE SULFATE 4 MG/ML
4 INJECTION, SOLUTION INTRAMUSCULAR; INTRAVENOUS ONCE
Status: COMPLETED | OUTPATIENT
Start: 2017-04-18 | End: 2017-04-18

## 2017-04-18 RX ORDER — SODIUM CHLORIDE 9 MG/ML
75 INJECTION, SOLUTION INTRAVENOUS CONTINUOUS
Status: DISCONTINUED | OUTPATIENT
Start: 2017-04-18 | End: 2017-04-20

## 2017-04-18 RX ORDER — ACETAMINOPHEN 10 MG/ML
1000 INJECTION, SOLUTION INTRAVENOUS ONCE
Status: COMPLETED | OUTPATIENT
Start: 2017-04-18 | End: 2017-04-18

## 2017-04-18 RX ORDER — HYDRALAZINE HYDROCHLORIDE 20 MG/ML
10 INJECTION INTRAMUSCULAR; INTRAVENOUS ONCE
Status: COMPLETED | OUTPATIENT
Start: 2017-04-18 | End: 2017-04-18

## 2017-04-18 RX ORDER — HYDRALAZINE HYDROCHLORIDE 20 MG/ML
10 INJECTION INTRAMUSCULAR; INTRAVENOUS EVERY 4 HOURS PRN
Status: DISCONTINUED | OUTPATIENT
Start: 2017-04-18 | End: 2017-04-18

## 2017-04-18 RX ADMIN — MORPHINE SULFATE 4 MG: 4 INJECTION, SOLUTION INTRAMUSCULAR; INTRAVENOUS at 09:06

## 2017-04-18 RX ADMIN — ACETAMINOPHEN 1000 MG: 10 INJECTION, SOLUTION INTRAVENOUS at 04:49

## 2017-04-18 RX ADMIN — SODIUM CHLORIDE 125 ML/HR: 9 INJECTION, SOLUTION INTRAVENOUS at 05:58

## 2017-04-18 RX ADMIN — MORPHINE SULFATE 2 MG: 2 INJECTION, SOLUTION INTRAMUSCULAR; INTRAVENOUS at 07:38

## 2017-04-18 RX ADMIN — Medication 10 ML: at 05:59

## 2017-04-18 RX ADMIN — PROPOFOL 35 MCG/KG/MIN: 10 INJECTION, EMULSION INTRAVENOUS at 08:20

## 2017-04-18 RX ADMIN — PROPOFOL 50 MCG/KG/MIN: 10 INJECTION, EMULSION INTRAVENOUS at 04:18

## 2017-04-18 RX ADMIN — SODIUM CHLORIDE 3 G: 9 INJECTION, SOLUTION INTRAVENOUS at 15:41

## 2017-04-18 RX ADMIN — CHLORHEXIDINE GLUCONATE 15 ML: 1.2 RINSE ORAL at 08:39

## 2017-04-18 RX ADMIN — PROPOFOL 45 MCG/KG/MIN: 10 INJECTION, EMULSION INTRAVENOUS at 02:03

## 2017-04-18 RX ADMIN — PROPOFOL 50 MCG/KG/MIN: 10 INJECTION, EMULSION INTRAVENOUS at 05:59

## 2017-04-18 RX ADMIN — HEPARIN SODIUM 7500 UNITS: 5000 INJECTION, SOLUTION INTRAVENOUS; SUBCUTANEOUS at 15:40

## 2017-04-18 RX ADMIN — HYDRALAZINE HYDROCHLORIDE 10 MG: 20 INJECTION INTRAMUSCULAR; INTRAVENOUS at 09:08

## 2017-04-18 RX ADMIN — SODIUM CHLORIDE 75 ML/HR: 9 INJECTION, SOLUTION INTRAVENOUS at 16:43

## 2017-04-18 RX ADMIN — MORPHINE SULFATE 2 MG: 2 INJECTION, SOLUTION INTRAMUSCULAR; INTRAVENOUS at 11:10

## 2017-04-18 RX ADMIN — PANTOPRAZOLE SODIUM 40 MG: 40 INJECTION, POWDER, FOR SOLUTION INTRAVENOUS at 08:39

## 2017-04-18 RX ADMIN — HEPARIN SODIUM 7500 UNITS: 5000 INJECTION, SOLUTION INTRAVENOUS; SUBCUTANEOUS at 05:58

## 2017-04-18 RX ADMIN — SODIUM CHLORIDE 75 ML/HR: 9 INJECTION, SOLUTION INTRAVENOUS at 20:56

## 2017-04-18 RX ADMIN — SODIUM CHLORIDE 3 G: 9 INJECTION, SOLUTION INTRAVENOUS at 20:54

## 2017-04-18 RX ADMIN — MORPHINE SULFATE 2 MG: 2 INJECTION, SOLUTION INTRAMUSCULAR; INTRAVENOUS at 16:22

## 2017-04-18 RX ADMIN — HEPARIN SODIUM 7500 UNITS: 5000 INJECTION, SOLUTION INTRAVENOUS; SUBCUTANEOUS at 22:34

## 2017-04-18 RX ADMIN — ACETAMINOPHEN 1000 MG: 10 INJECTION, SOLUTION INTRAVENOUS at 17:31

## 2017-04-18 RX ADMIN — MORPHINE SULFATE 2 MG: 2 INJECTION, SOLUTION INTRAMUSCULAR; INTRAVENOUS at 03:19

## 2017-04-19 LAB
GLUCOSE BLDC GLUCOMTR-MCNC: 102 MG/DL (ref 70–130)
GLUCOSE BLDC GLUCOMTR-MCNC: 109 MG/DL (ref 70–130)
GLUCOSE BLDC GLUCOMTR-MCNC: 110 MG/DL (ref 70–130)
GLUCOSE BLDC GLUCOMTR-MCNC: 120 MG/DL (ref 70–130)

## 2017-04-19 PROCEDURE — 99024 POSTOP FOLLOW-UP VISIT: CPT | Performed by: SURGERY

## 2017-04-19 PROCEDURE — 25010000002 MORPHINE PER 10 MG: Performed by: SURGERY

## 2017-04-19 PROCEDURE — 25010000002 HYDRALAZINE PER 20 MG: Performed by: SURGERY

## 2017-04-19 PROCEDURE — 25010000003 AMPICILLIN-SULBACTAM PER 1.5 G: Performed by: SURGERY

## 2017-04-19 PROCEDURE — 82962 GLUCOSE BLOOD TEST: CPT

## 2017-04-19 PROCEDURE — 25010000002 HEPARIN (PORCINE) PER 1000 UNITS: Performed by: SURGERY

## 2017-04-19 PROCEDURE — 25010000002 ONDANSETRON PER 1 MG: Performed by: SURGERY

## 2017-04-19 PROCEDURE — 99232 SBSQ HOSP IP/OBS MODERATE 35: CPT | Performed by: INTERNAL MEDICINE

## 2017-04-19 PROCEDURE — 25010000002 HYDROMORPHONE PER 4 MG: Performed by: SURGERY

## 2017-04-19 PROCEDURE — 25010000002 KETOROLAC TROMETHAMINE PER 15 MG: Performed by: INTERNAL MEDICINE

## 2017-04-19 RX ORDER — KETOROLAC TROMETHAMINE 30 MG/ML
30 INJECTION, SOLUTION INTRAMUSCULAR; INTRAVENOUS ONCE
Status: COMPLETED | OUTPATIENT
Start: 2017-04-19 | End: 2017-04-19

## 2017-04-19 RX ORDER — HYDRALAZINE HYDROCHLORIDE 20 MG/ML
10 INJECTION INTRAMUSCULAR; INTRAVENOUS EVERY 6 HOURS PRN
Status: DISCONTINUED | OUTPATIENT
Start: 2017-04-19 | End: 2017-04-20

## 2017-04-19 RX ADMIN — HEPARIN SODIUM 7500 UNITS: 5000 INJECTION, SOLUTION INTRAVENOUS; SUBCUTANEOUS at 21:47

## 2017-04-19 RX ADMIN — HYDROMORPHONE HYDROCHLORIDE 0.5 MG: 1 INJECTION, SOLUTION INTRAMUSCULAR; INTRAVENOUS; SUBCUTANEOUS at 20:25

## 2017-04-19 RX ADMIN — SODIUM CHLORIDE 3 G: 9 INJECTION, SOLUTION INTRAVENOUS at 20:19

## 2017-04-19 RX ADMIN — KETOROLAC TROMETHAMINE 30 MG: 30 INJECTION, SOLUTION INTRAMUSCULAR at 08:52

## 2017-04-19 RX ADMIN — PANTOPRAZOLE SODIUM 40 MG: 40 INJECTION, POWDER, FOR SOLUTION INTRAVENOUS at 08:29

## 2017-04-19 RX ADMIN — HEPARIN SODIUM 7500 UNITS: 5000 INJECTION, SOLUTION INTRAVENOUS; SUBCUTANEOUS at 13:56

## 2017-04-19 RX ADMIN — HEPARIN SODIUM 7500 UNITS: 5000 INJECTION, SOLUTION INTRAVENOUS; SUBCUTANEOUS at 06:29

## 2017-04-19 RX ADMIN — MORPHINE SULFATE 2 MG: 2 INJECTION, SOLUTION INTRAMUSCULAR; INTRAVENOUS at 03:57

## 2017-04-19 RX ADMIN — SODIUM CHLORIDE 3 G: 9 INJECTION, SOLUTION INTRAVENOUS at 08:29

## 2017-04-19 RX ADMIN — HYDRALAZINE HYDROCHLORIDE 10 MG: 20 INJECTION INTRAMUSCULAR; INTRAVENOUS at 00:49

## 2017-04-19 RX ADMIN — SODIUM CHLORIDE 3 G: 9 INJECTION, SOLUTION INTRAVENOUS at 13:56

## 2017-04-19 RX ADMIN — ONDANSETRON 4 MG: 2 INJECTION INTRAMUSCULAR; INTRAVENOUS at 11:34

## 2017-04-19 RX ADMIN — SODIUM CHLORIDE 3 G: 9 INJECTION, SOLUTION INTRAVENOUS at 01:59

## 2017-04-19 RX ADMIN — MORPHINE SULFATE 2 MG: 2 INJECTION, SOLUTION INTRAMUSCULAR; INTRAVENOUS at 01:04

## 2017-04-20 LAB
ALBUMIN SERPL-MCNC: 3.5 G/DL (ref 3.5–5)
ALBUMIN/GLOB SERPL: 0.9 G/DL (ref 1–2)
ALP SERPL-CCNC: 55 U/L (ref 38–126)
ALT SERPL W P-5'-P-CCNC: 60 U/L (ref 13–69)
ANION GAP SERPL CALCULATED.3IONS-SCNC: 13.2 MMOL/L
AST SERPL-CCNC: 38 U/L (ref 15–46)
BASOPHILS # BLD AUTO: 0.06 10*3/MM3 (ref 0–0.2)
BASOPHILS NFR BLD AUTO: 0.4 % (ref 0–2.5)
BILIRUB SERPL-MCNC: 0.5 MG/DL (ref 0.2–1.3)
BUN BLD-MCNC: 14 MG/DL (ref 7–20)
BUN/CREAT SERPL: 17.5 (ref 7.1–23.5)
CALCIUM SPEC-SCNC: 8.8 MG/DL (ref 8.4–10.2)
CHLORIDE SERPL-SCNC: 106 MMOL/L (ref 98–107)
CO2 SERPL-SCNC: 29 MMOL/L (ref 26–30)
CREAT BLD-MCNC: 0.8 MG/DL (ref 0.6–1.3)
DEPRECATED RDW RBC AUTO: 46.5 FL (ref 37–54)
EOSINOPHIL # BLD AUTO: 0.03 10*3/MM3 (ref 0–0.7)
EOSINOPHIL NFR BLD AUTO: 0.2 % (ref 0–7)
ERYTHROCYTE [DISTWIDTH] IN BLOOD BY AUTOMATED COUNT: 15.8 % (ref 11.5–14.5)
GFR SERPL CREATININE-BSD FRML MDRD: 101 ML/MIN/1.73
GLOBULIN UR ELPH-MCNC: 3.9 GM/DL
GLUCOSE BLD-MCNC: 109 MG/DL (ref 74–98)
GLUCOSE BLDC GLUCOMTR-MCNC: 102 MG/DL (ref 70–130)
GLUCOSE BLDC GLUCOMTR-MCNC: 103 MG/DL (ref 70–130)
GLUCOSE BLDC GLUCOMTR-MCNC: 105 MG/DL (ref 70–130)
GLUCOSE BLDC GLUCOMTR-MCNC: 121 MG/DL (ref 70–130)
GLUCOSE BLDC GLUCOMTR-MCNC: 82 MG/DL (ref 70–130)
HCT VFR BLD AUTO: 41.7 % (ref 37–47)
HGB BLD-MCNC: 12.6 G/DL (ref 12–16)
IMM GRANULOCYTES # BLD: 0.19 10*3/MM3 (ref 0–0.06)
IMM GRANULOCYTES NFR BLD: 1.2 % (ref 0–0.6)
LYMPHOCYTES # BLD AUTO: 2.94 10*3/MM3 (ref 0.6–3.4)
LYMPHOCYTES NFR BLD AUTO: 17.9 % (ref 10–50)
MCH RBC QN AUTO: 24.5 PG (ref 27–31)
MCHC RBC AUTO-ENTMCNC: 30.2 G/DL (ref 30–37)
MCV RBC AUTO: 81.1 FL (ref 81–99)
MONOCYTES # BLD AUTO: 1.47 10*3/MM3 (ref 0–0.9)
MONOCYTES NFR BLD AUTO: 9 % (ref 0–12)
MRSA SPEC QL CULT: NORMAL
NEUTROPHILS # BLD AUTO: 11.71 10*3/MM3 (ref 2–6.9)
NEUTROPHILS NFR BLD AUTO: 71.3 % (ref 37–80)
NRBC BLD MANUAL-RTO: 0 /100 WBC (ref 0–0)
PLATELET # BLD AUTO: 329 10*3/MM3 (ref 130–400)
PMV BLD AUTO: 10.4 FL (ref 6–12)
POTASSIUM BLD-SCNC: 4.2 MMOL/L (ref 3.5–5.1)
PROT SERPL-MCNC: 7.4 G/DL (ref 6.3–8.2)
RBC # BLD AUTO: 5.14 10*6/MM3 (ref 4.2–5.4)
SODIUM BLD-SCNC: 144 MMOL/L (ref 137–145)
WBC NRBC COR # BLD: 16.4 10*3/MM3 (ref 4.8–10.8)

## 2017-04-20 PROCEDURE — 25010000002 HEPARIN (PORCINE) PER 1000 UNITS: Performed by: SURGERY

## 2017-04-20 PROCEDURE — 25010000002 KETOROLAC TROMETHAMINE PER 15 MG: Performed by: SURGERY

## 2017-04-20 PROCEDURE — 94799 UNLISTED PULMONARY SVC/PX: CPT

## 2017-04-20 PROCEDURE — 25010000002 HYDROMORPHONE PER 4 MG: Performed by: SURGERY

## 2017-04-20 PROCEDURE — 25010000002 ONDANSETRON PER 1 MG: Performed by: SURGERY

## 2017-04-20 PROCEDURE — 82962 GLUCOSE BLOOD TEST: CPT

## 2017-04-20 PROCEDURE — 25010000003 AMPICILLIN-SULBACTAM PER 1.5 G: Performed by: SURGERY

## 2017-04-20 PROCEDURE — 99024 POSTOP FOLLOW-UP VISIT: CPT | Performed by: SURGERY

## 2017-04-20 PROCEDURE — 85025 COMPLETE CBC W/AUTO DIFF WBC: CPT | Performed by: SURGERY

## 2017-04-20 PROCEDURE — 80053 COMPREHEN METABOLIC PANEL: CPT | Performed by: SURGERY

## 2017-04-20 RX ORDER — GABAPENTIN 300 MG/1
300 CAPSULE ORAL 3 TIMES DAILY
Status: DISCONTINUED | OUTPATIENT
Start: 2017-04-20 | End: 2017-04-23 | Stop reason: HOSPADM

## 2017-04-20 RX ORDER — SODIUM CHLORIDE 9 MG/ML
50 INJECTION, SOLUTION INTRAVENOUS CONTINUOUS
Status: DISCONTINUED | OUTPATIENT
Start: 2017-04-20 | End: 2017-04-21

## 2017-04-20 RX ORDER — BUSPIRONE HYDROCHLORIDE 10 MG/1
5 TABLET ORAL 3 TIMES DAILY
Status: DISCONTINUED | OUTPATIENT
Start: 2017-04-20 | End: 2017-04-23 | Stop reason: HOSPADM

## 2017-04-20 RX ORDER — KETOROLAC TROMETHAMINE 30 MG/ML
30 INJECTION, SOLUTION INTRAMUSCULAR; INTRAVENOUS ONCE AS NEEDED
Status: COMPLETED | OUTPATIENT
Start: 2017-04-20 | End: 2017-04-20

## 2017-04-20 RX ORDER — LISINOPRIL AND HYDROCHLOROTHIAZIDE 20; 12.5 MG/1; MG/1
1 TABLET ORAL DAILY
Status: DISCONTINUED | OUTPATIENT
Start: 2017-04-21 | End: 2017-04-23 | Stop reason: HOSPADM

## 2017-04-20 RX ORDER — FUROSEMIDE 20 MG/1
20 TABLET ORAL DAILY PRN
COMMUNITY
End: 2017-05-04

## 2017-04-20 RX ORDER — KETOROLAC TROMETHAMINE 30 MG/ML
30 INJECTION, SOLUTION INTRAMUSCULAR; INTRAVENOUS EVERY 8 HOURS
Status: DISCONTINUED | OUTPATIENT
Start: 2017-04-20 | End: 2017-04-22

## 2017-04-20 RX ORDER — LISINOPRIL AND HYDROCHLOROTHIAZIDE 20; 12.5 MG/1; MG/1
1 TABLET ORAL DAILY
COMMUNITY

## 2017-04-20 RX ADMIN — ONDANSETRON 4 MG: 2 INJECTION INTRAMUSCULAR; INTRAVENOUS at 07:55

## 2017-04-20 RX ADMIN — HEPARIN SODIUM 7500 UNITS: 5000 INJECTION, SOLUTION INTRAVENOUS; SUBCUTANEOUS at 16:02

## 2017-04-20 RX ADMIN — KETOROLAC TROMETHAMINE 30 MG: 30 INJECTION, SOLUTION INTRAMUSCULAR at 12:46

## 2017-04-20 RX ADMIN — GABAPENTIN 300 MG: 300 CAPSULE ORAL at 23:09

## 2017-04-20 RX ADMIN — KETOROLAC TROMETHAMINE 30 MG: 30 INJECTION, SOLUTION INTRAMUSCULAR at 21:07

## 2017-04-20 RX ADMIN — SODIUM CHLORIDE 3 G: 9 INJECTION, SOLUTION INTRAVENOUS at 15:30

## 2017-04-20 RX ADMIN — HYDROMORPHONE HYDROCHLORIDE 0.5 MG: 1 INJECTION, SOLUTION INTRAMUSCULAR; INTRAVENOUS; SUBCUTANEOUS at 11:58

## 2017-04-20 RX ADMIN — SODIUM CHLORIDE 3 G: 9 INJECTION, SOLUTION INTRAVENOUS at 20:17

## 2017-04-20 RX ADMIN — SODIUM CHLORIDE 75 ML/HR: 9 INJECTION, SOLUTION INTRAVENOUS at 16:53

## 2017-04-20 RX ADMIN — SODIUM CHLORIDE 75 ML/HR: 9 INJECTION, SOLUTION INTRAVENOUS at 00:47

## 2017-04-20 RX ADMIN — SODIUM CHLORIDE 3 G: 9 INJECTION, SOLUTION INTRAVENOUS at 09:00

## 2017-04-20 RX ADMIN — BUSPIRONE HYDROCHLORIDE 5 MG: 10 TABLET ORAL at 23:09

## 2017-04-20 RX ADMIN — SODIUM CHLORIDE 3 G: 9 INJECTION, SOLUTION INTRAVENOUS at 03:27

## 2017-04-20 RX ADMIN — PANTOPRAZOLE SODIUM 40 MG: 40 INJECTION, POWDER, FOR SOLUTION INTRAVENOUS at 09:43

## 2017-04-20 RX ADMIN — HYDROMORPHONE HYDROCHLORIDE 0.5 MG: 1 INJECTION, SOLUTION INTRAMUSCULAR; INTRAVENOUS; SUBCUTANEOUS at 08:26

## 2017-04-20 RX ADMIN — HEPARIN SODIUM 7500 UNITS: 5000 INJECTION, SOLUTION INTRAVENOUS; SUBCUTANEOUS at 21:07

## 2017-04-20 RX ADMIN — HYDROMORPHONE HYDROCHLORIDE 0.5 MG: 1 INJECTION, SOLUTION INTRAMUSCULAR; INTRAVENOUS; SUBCUTANEOUS at 00:47

## 2017-04-20 RX ADMIN — HEPARIN SODIUM 7500 UNITS: 5000 INJECTION, SOLUTION INTRAVENOUS; SUBCUTANEOUS at 05:40

## 2017-04-21 PROBLEM — D72.820 LYMPHOCYTOSIS: Status: ACTIVE | Noted: 2017-04-21

## 2017-04-21 PROBLEM — A49.02 MRSA (METHICILLIN RESISTANT STAPHYLOCOCCUS AUREUS) INFECTION: Status: ACTIVE | Noted: 2017-04-21

## 2017-04-21 LAB
ANISOCYTOSIS BLD QL: NORMAL
BACTERIA SPEC RESP CULT: ABNORMAL
BACTERIA SPEC RESP CULT: ABNORMAL
BASOPHILS # BLD AUTO: 0.08 10*3/MM3 (ref 0–0.2)
BASOPHILS NFR BLD AUTO: 0.6 % (ref 0–2.5)
DEPRECATED RDW RBC AUTO: 45.7 FL (ref 37–54)
EOSINOPHIL # BLD AUTO: 0.17 10*3/MM3 (ref 0–0.7)
EOSINOPHIL NFR BLD AUTO: 1.2 % (ref 0–7)
ERYTHROCYTE [DISTWIDTH] IN BLOOD BY AUTOMATED COUNT: 15.5 % (ref 11.5–14.5)
GLUCOSE BLDC GLUCOMTR-MCNC: 102 MG/DL (ref 70–130)
GLUCOSE BLDC GLUCOMTR-MCNC: 92 MG/DL (ref 70–130)
GLUCOSE BLDC GLUCOMTR-MCNC: 93 MG/DL (ref 70–130)
GLUCOSE BLDC GLUCOMTR-MCNC: 95 MG/DL (ref 70–130)
GRAM STN SPEC: ABNORMAL
HCT VFR BLD AUTO: 41.3 % (ref 37–47)
HGB BLD-MCNC: 12.4 G/DL (ref 12–16)
HYPOCHROMIA BLD QL: NORMAL
IMM GRANULOCYTES # BLD: 0.26 10*3/MM3 (ref 0–0.06)
IMM GRANULOCYTES NFR BLD: 1.9 % (ref 0–0.6)
LAB AP CASE REPORT: NORMAL
LYMPHOCYTES # BLD AUTO: 2.69 10*3/MM3 (ref 0.6–3.4)
LYMPHOCYTES NFR BLD AUTO: 19.2 % (ref 10–50)
Lab: NORMAL
MCH RBC QN AUTO: 24.4 PG (ref 27–31)
MCHC RBC AUTO-ENTMCNC: 30 G/DL (ref 30–37)
MCV RBC AUTO: 81.1 FL (ref 81–99)
MONOCYTES # BLD AUTO: 1.06 10*3/MM3 (ref 0–0.9)
MONOCYTES NFR BLD AUTO: 7.6 % (ref 0–12)
NEUTROPHILS # BLD AUTO: 9.77 10*3/MM3 (ref 2–6.9)
NEUTROPHILS NFR BLD AUTO: 69.5 % (ref 37–80)
NRBC BLD MANUAL-RTO: 0 /100 WBC (ref 0–0)
PATH REPORT.FINAL DX SPEC: NORMAL
PLAT MORPH BLD: NORMAL
PLATELET # BLD AUTO: 328 10*3/MM3 (ref 130–400)
PMV BLD AUTO: 10.6 FL (ref 6–12)
RBC # BLD AUTO: 5.09 10*6/MM3 (ref 4.2–5.4)
WBC MORPH BLD: NORMAL
WBC NRBC COR # BLD: 14.03 10*3/MM3 (ref 4.8–10.8)

## 2017-04-21 PROCEDURE — 25010000002 HEPARIN (PORCINE) PER 1000 UNITS: Performed by: SURGERY

## 2017-04-21 PROCEDURE — 94762 N-INVAS EAR/PLS OXIMTRY CONT: CPT

## 2017-04-21 PROCEDURE — 25010000002 KETOROLAC TROMETHAMINE PER 15 MG: Performed by: SURGERY

## 2017-04-21 PROCEDURE — 99024 POSTOP FOLLOW-UP VISIT: CPT | Performed by: SURGERY

## 2017-04-21 PROCEDURE — 85025 COMPLETE CBC W/AUTO DIFF WBC: CPT | Performed by: SURGERY

## 2017-04-21 PROCEDURE — 94799 UNLISTED PULMONARY SVC/PX: CPT

## 2017-04-21 PROCEDURE — 82962 GLUCOSE BLOOD TEST: CPT

## 2017-04-21 PROCEDURE — 85007 BL SMEAR W/DIFF WBC COUNT: CPT | Performed by: SURGERY

## 2017-04-21 PROCEDURE — 25010000003 AMPICILLIN-SULBACTAM PER 1.5 G: Performed by: SURGERY

## 2017-04-21 RX ORDER — ACETAMINOPHEN 325 MG/1
650 TABLET ORAL EVERY 8 HOURS PRN
Status: DISCONTINUED | OUTPATIENT
Start: 2017-04-21 | End: 2017-04-23 | Stop reason: HOSPADM

## 2017-04-21 RX ADMIN — HEPARIN SODIUM 7500 UNITS: 5000 INJECTION, SOLUTION INTRAVENOUS; SUBCUTANEOUS at 06:10

## 2017-04-21 RX ADMIN — SODIUM CHLORIDE 3 G: 9 INJECTION, SOLUTION INTRAVENOUS at 09:36

## 2017-04-21 RX ADMIN — HEPARIN SODIUM 7500 UNITS: 5000 INJECTION, SOLUTION INTRAVENOUS; SUBCUTANEOUS at 16:18

## 2017-04-21 RX ADMIN — BUSPIRONE HYDROCHLORIDE 5 MG: 10 TABLET ORAL at 16:19

## 2017-04-21 RX ADMIN — KETOROLAC TROMETHAMINE 30 MG: 30 INJECTION, SOLUTION INTRAMUSCULAR at 06:10

## 2017-04-21 RX ADMIN — GABAPENTIN 300 MG: 300 CAPSULE ORAL at 09:20

## 2017-04-21 RX ADMIN — KETOROLAC TROMETHAMINE 30 MG: 30 INJECTION, SOLUTION INTRAMUSCULAR at 22:00

## 2017-04-21 RX ADMIN — PANTOPRAZOLE SODIUM 40 MG: 40 INJECTION, POWDER, FOR SOLUTION INTRAVENOUS at 09:20

## 2017-04-21 RX ADMIN — SODIUM CHLORIDE 900 MG: 9 INJECTION, SOLUTION INTRAVENOUS at 22:00

## 2017-04-21 RX ADMIN — HEPARIN SODIUM 7500 UNITS: 5000 INJECTION, SOLUTION INTRAVENOUS; SUBCUTANEOUS at 22:26

## 2017-04-21 RX ADMIN — BUSPIRONE HYDROCHLORIDE 5 MG: 10 TABLET ORAL at 09:20

## 2017-04-21 RX ADMIN — LISINOPRIL AND HYDROCHLOROTHIAZIDE 1 TABLET: 12.5; 2 TABLET ORAL at 09:20

## 2017-04-21 RX ADMIN — SERTRALINE HYDROCHLORIDE 50 MG: 50 TABLET ORAL at 09:20

## 2017-04-21 RX ADMIN — SODIUM CHLORIDE 3 G: 9 INJECTION, SOLUTION INTRAVENOUS at 03:11

## 2017-04-21 RX ADMIN — SODIUM CHLORIDE 900 MG: 9 INJECTION, SOLUTION INTRAVENOUS at 16:20

## 2017-04-21 RX ADMIN — ACETAMINOPHEN 650 MG: 325 TABLET, FILM COATED ORAL at 12:51

## 2017-04-21 RX ADMIN — GABAPENTIN 300 MG: 300 CAPSULE ORAL at 22:00

## 2017-04-21 RX ADMIN — GABAPENTIN 300 MG: 300 CAPSULE ORAL at 16:19

## 2017-04-21 RX ADMIN — BUSPIRONE HYDROCHLORIDE 5 MG: 10 TABLET ORAL at 22:00

## 2017-04-22 LAB
GLUCOSE BLDC GLUCOMTR-MCNC: 106 MG/DL (ref 70–130)
GLUCOSE BLDC GLUCOMTR-MCNC: 107 MG/DL (ref 70–130)
GLUCOSE BLDC GLUCOMTR-MCNC: 110 MG/DL (ref 70–130)
GLUCOSE BLDC GLUCOMTR-MCNC: 111 MG/DL (ref 70–130)

## 2017-04-22 PROCEDURE — 25010000002 KETOROLAC TROMETHAMINE PER 15 MG: Performed by: SURGERY

## 2017-04-22 PROCEDURE — 99024 POSTOP FOLLOW-UP VISIT: CPT | Performed by: SURGERY

## 2017-04-22 PROCEDURE — 82962 GLUCOSE BLOOD TEST: CPT

## 2017-04-22 PROCEDURE — 25010000002 HEPARIN (PORCINE) PER 1000 UNITS: Performed by: SURGERY

## 2017-04-22 RX ORDER — CLINDAMYCIN HYDROCHLORIDE 150 MG/1
300 CAPSULE ORAL EVERY 6 HOURS SCHEDULED
Status: DISCONTINUED | OUTPATIENT
Start: 2017-04-22 | End: 2017-04-23 | Stop reason: HOSPADM

## 2017-04-22 RX ORDER — IBUPROFEN 800 MG/1
800 TABLET ORAL EVERY 6 HOURS PRN
Status: DISCONTINUED | OUTPATIENT
Start: 2017-04-22 | End: 2017-04-23 | Stop reason: HOSPADM

## 2017-04-22 RX ORDER — HYDROCODONE BITARTRATE AND ACETAMINOPHEN 7.5; 325 MG/1; MG/1
1 TABLET ORAL EVERY 6 HOURS PRN
Status: DISCONTINUED | OUTPATIENT
Start: 2017-04-22 | End: 2017-04-23 | Stop reason: HOSPADM

## 2017-04-22 RX ADMIN — GABAPENTIN 300 MG: 300 CAPSULE ORAL at 21:29

## 2017-04-22 RX ADMIN — GABAPENTIN 300 MG: 300 CAPSULE ORAL at 16:15

## 2017-04-22 RX ADMIN — HEPARIN SODIUM 7500 UNITS: 5000 INJECTION, SOLUTION INTRAVENOUS; SUBCUTANEOUS at 05:26

## 2017-04-22 RX ADMIN — BUSPIRONE HYDROCHLORIDE 5 MG: 10 TABLET ORAL at 16:15

## 2017-04-22 RX ADMIN — HEPARIN SODIUM 7500 UNITS: 5000 INJECTION, SOLUTION INTRAVENOUS; SUBCUTANEOUS at 13:28

## 2017-04-22 RX ADMIN — BUSPIRONE HYDROCHLORIDE 5 MG: 10 TABLET ORAL at 21:29

## 2017-04-22 RX ADMIN — GABAPENTIN 300 MG: 300 CAPSULE ORAL at 08:29

## 2017-04-22 RX ADMIN — SODIUM CHLORIDE 900 MG: 9 INJECTION, SOLUTION INTRAVENOUS at 13:28

## 2017-04-22 RX ADMIN — SODIUM CHLORIDE 900 MG: 9 INJECTION, SOLUTION INTRAVENOUS at 05:27

## 2017-04-22 RX ADMIN — SERTRALINE HYDROCHLORIDE 50 MG: 50 TABLET ORAL at 08:29

## 2017-04-22 RX ADMIN — PANTOPRAZOLE SODIUM 40 MG: 40 INJECTION, POWDER, FOR SOLUTION INTRAVENOUS at 08:29

## 2017-04-22 RX ADMIN — KETOROLAC TROMETHAMINE 30 MG: 30 INJECTION, SOLUTION INTRAMUSCULAR at 13:28

## 2017-04-22 RX ADMIN — HEPARIN SODIUM 7500 UNITS: 5000 INJECTION, SOLUTION INTRAVENOUS; SUBCUTANEOUS at 21:30

## 2017-04-22 RX ADMIN — CLINDAMYCIN HYDROCHLORIDE 300 MG: 150 CAPSULE ORAL at 23:00

## 2017-04-22 RX ADMIN — LISINOPRIL AND HYDROCHLOROTHIAZIDE 1 TABLET: 12.5; 2 TABLET ORAL at 08:29

## 2017-04-22 RX ADMIN — HYDROCODONE BITARTRATE AND ACETAMINOPHEN 1 TABLET: 7.5; 325 TABLET ORAL at 22:56

## 2017-04-22 RX ADMIN — KETOROLAC TROMETHAMINE 30 MG: 30 INJECTION, SOLUTION INTRAMUSCULAR at 05:26

## 2017-04-22 RX ADMIN — BUSPIRONE HYDROCHLORIDE 5 MG: 10 TABLET ORAL at 08:29

## 2017-04-22 RX ADMIN — CLINDAMYCIN HYDROCHLORIDE 300 MG: 150 CAPSULE ORAL at 17:54

## 2017-04-23 VITALS
HEIGHT: 63 IN | SYSTOLIC BLOOD PRESSURE: 142 MMHG | BODY MASS INDEX: 51.91 KG/M2 | HEART RATE: 76 BPM | DIASTOLIC BLOOD PRESSURE: 85 MMHG | OXYGEN SATURATION: 96 % | RESPIRATION RATE: 20 BRPM | TEMPERATURE: 98 F | WEIGHT: 293 LBS

## 2017-04-23 PROBLEM — D72.820 LYMPHOCYTOSIS: Status: RESOLVED | Noted: 2017-04-21 | Resolved: 2017-04-23

## 2017-04-23 LAB
ALBUMIN SERPL-MCNC: 3.2 G/DL (ref 3.5–5)
ALBUMIN/GLOB SERPL: 0.9 G/DL (ref 1–2)
ALP SERPL-CCNC: 64 U/L (ref 38–126)
ALT SERPL W P-5'-P-CCNC: 58 U/L (ref 13–69)
ANION GAP SERPL CALCULATED.3IONS-SCNC: 13.6 MMOL/L
AST SERPL-CCNC: 36 U/L (ref 15–46)
BASOPHILS # BLD AUTO: 0.07 10*3/MM3 (ref 0–0.2)
BASOPHILS NFR BLD AUTO: 0.5 % (ref 0–2.5)
BILIRUB SERPL-MCNC: 0.4 MG/DL (ref 0.2–1.3)
BUN BLD-MCNC: 11 MG/DL (ref 7–20)
BUN/CREAT SERPL: 15.7 (ref 7.1–23.5)
CALCIUM SPEC-SCNC: 9 MG/DL (ref 8.4–10.2)
CHLORIDE SERPL-SCNC: 105 MMOL/L (ref 98–107)
CO2 SERPL-SCNC: 23 MMOL/L (ref 26–30)
CREAT BLD-MCNC: 0.7 MG/DL (ref 0.6–1.3)
DEPRECATED RDW RBC AUTO: 43.3 FL (ref 37–54)
EOSINOPHIL # BLD AUTO: 0.39 10*3/MM3 (ref 0–0.7)
EOSINOPHIL NFR BLD AUTO: 3 % (ref 0–7)
ERYTHROCYTE [DISTWIDTH] IN BLOOD BY AUTOMATED COUNT: 15.3 % (ref 11.5–14.5)
GFR SERPL CREATININE-BSD FRML MDRD: 118 ML/MIN/1.73
GLOBULIN UR ELPH-MCNC: 3.4 GM/DL
GLUCOSE BLD-MCNC: 111 MG/DL (ref 74–98)
GLUCOSE BLDC GLUCOMTR-MCNC: 103 MG/DL (ref 70–130)
GLUCOSE BLDC GLUCOMTR-MCNC: 124 MG/DL (ref 70–130)
HCT VFR BLD AUTO: 39.7 % (ref 37–47)
HGB BLD-MCNC: 12.5 G/DL (ref 12–16)
IMM GRANULOCYTES # BLD: 0.37 10*3/MM3 (ref 0–0.06)
IMM GRANULOCYTES NFR BLD: 2.8 % (ref 0–0.6)
LYMPHOCYTES # BLD AUTO: 2.14 10*3/MM3 (ref 0.6–3.4)
LYMPHOCYTES NFR BLD AUTO: 16.3 % (ref 10–50)
MCH RBC QN AUTO: 24.9 PG (ref 27–31)
MCHC RBC AUTO-ENTMCNC: 31.5 G/DL (ref 30–37)
MCV RBC AUTO: 78.9 FL (ref 81–99)
MONOCYTES # BLD AUTO: 0.89 10*3/MM3 (ref 0–0.9)
MONOCYTES NFR BLD AUTO: 6.8 % (ref 0–12)
NEUTROPHILS # BLD AUTO: 9.29 10*3/MM3 (ref 2–6.9)
NEUTROPHILS NFR BLD AUTO: 70.6 % (ref 37–80)
NRBC BLD MANUAL-RTO: 0 /100 WBC (ref 0–0)
PLATELET # BLD AUTO: 366 10*3/MM3 (ref 130–400)
PMV BLD AUTO: 10.4 FL (ref 6–12)
POTASSIUM BLD-SCNC: 3.6 MMOL/L (ref 3.5–5.1)
PROT SERPL-MCNC: 6.6 G/DL (ref 6.3–8.2)
RBC # BLD AUTO: 5.03 10*6/MM3 (ref 4.2–5.4)
SODIUM BLD-SCNC: 138 MMOL/L (ref 137–145)
WBC NRBC COR # BLD: 13.15 10*3/MM3 (ref 4.8–10.8)

## 2017-04-23 PROCEDURE — 85025 COMPLETE CBC W/AUTO DIFF WBC: CPT | Performed by: SURGERY

## 2017-04-23 PROCEDURE — 80053 COMPREHEN METABOLIC PANEL: CPT | Performed by: SURGERY

## 2017-04-23 PROCEDURE — 82962 GLUCOSE BLOOD TEST: CPT

## 2017-04-23 PROCEDURE — 25010000002 HEPARIN (PORCINE) PER 1000 UNITS: Performed by: SURGERY

## 2017-04-23 PROCEDURE — 99024 POSTOP FOLLOW-UP VISIT: CPT | Performed by: SURGERY

## 2017-04-23 RX ORDER — CLINDAMYCIN HYDROCHLORIDE 300 MG/1
300 CAPSULE ORAL EVERY 6 HOURS SCHEDULED
Qty: 12 CAPSULE | Refills: 0 | Status: SHIPPED | OUTPATIENT
Start: 2017-04-23 | End: 2017-05-17

## 2017-04-23 RX ORDER — DOCUSATE SODIUM 100 MG/1
100 CAPSULE, LIQUID FILLED ORAL 2 TIMES DAILY
Qty: 60 CAPSULE | Refills: 1 | Status: SHIPPED | OUTPATIENT
Start: 2017-04-23 | End: 2017-08-25

## 2017-04-23 RX ORDER — HYDROCODONE BITARTRATE AND ACETAMINOPHEN 7.5; 325 MG/1; MG/1
1 TABLET ORAL EVERY 6 HOURS PRN
Qty: 30 TABLET | Refills: 0 | Status: SHIPPED | OUTPATIENT
Start: 2017-04-23 | End: 2017-05-02

## 2017-04-23 RX ORDER — IBUPROFEN 800 MG/1
800 TABLET ORAL EVERY 6 HOURS PRN
Qty: 15 TABLET | Refills: 0 | Status: SHIPPED | OUTPATIENT
Start: 2017-04-23 | End: 2017-09-12

## 2017-04-23 RX ADMIN — GABAPENTIN 300 MG: 300 CAPSULE ORAL at 09:05

## 2017-04-23 RX ADMIN — HEPARIN SODIUM 7500 UNITS: 5000 INJECTION, SOLUTION INTRAVENOUS; SUBCUTANEOUS at 05:28

## 2017-04-23 RX ADMIN — BUSPIRONE HYDROCHLORIDE 5 MG: 10 TABLET ORAL at 09:05

## 2017-04-23 RX ADMIN — HYDROCODONE BITARTRATE AND ACETAMINOPHEN 1 TABLET: 7.5; 325 TABLET ORAL at 09:04

## 2017-04-23 RX ADMIN — SERTRALINE HYDROCHLORIDE 50 MG: 50 TABLET ORAL at 09:05

## 2017-04-23 RX ADMIN — LISINOPRIL AND HYDROCHLOROTHIAZIDE 1 TABLET: 12.5; 2 TABLET ORAL at 09:05

## 2017-04-23 RX ADMIN — CLINDAMYCIN HYDROCHLORIDE 300 MG: 150 CAPSULE ORAL at 05:29

## 2017-04-23 RX ADMIN — CLINDAMYCIN HYDROCHLORIDE 300 MG: 150 CAPSULE ORAL at 11:19

## 2017-05-04 ENCOUNTER — OFFICE VISIT (OUTPATIENT)
Dept: SURGERY | Facility: CLINIC | Age: 32
End: 2017-05-04

## 2017-05-04 VITALS
HEIGHT: 63 IN | BODY MASS INDEX: 51.91 KG/M2 | RESPIRATION RATE: 18 BRPM | WEIGHT: 293 LBS | DIASTOLIC BLOOD PRESSURE: 86 MMHG | HEART RATE: 88 BPM | SYSTOLIC BLOOD PRESSURE: 138 MMHG | TEMPERATURE: 97.8 F

## 2017-05-04 DIAGNOSIS — Z90.49 S/P CHOLECYSTECTOMY: Primary | ICD-10-CM

## 2017-05-04 PROBLEM — K80.20 GALLSTONES: Status: RESOLVED | Noted: 2017-04-17 | Resolved: 2017-05-04

## 2017-05-04 PROCEDURE — 99024 POSTOP FOLLOW-UP VISIT: CPT | Performed by: SURGERY

## 2017-05-04 RX ORDER — BLOOD-GLUCOSE METER
KIT MISCELLANEOUS
COMMUNITY
Start: 2017-05-03 | End: 2021-08-12

## 2017-05-04 RX ORDER — OXYBUTYNIN CHLORIDE 10 MG/1
10 TABLET, EXTENDED RELEASE ORAL DAILY
COMMUNITY
Start: 2017-05-03 | End: 2017-09-12

## 2017-05-04 RX ORDER — CYCLOBENZAPRINE HCL 10 MG
10 TABLET ORAL 2 TIMES DAILY PRN
COMMUNITY
Start: 2017-05-03

## 2017-05-04 RX ORDER — LANCETS 28 GAUGE
EACH MISCELLANEOUS
COMMUNITY
Start: 2017-05-03 | End: 2021-08-12

## 2017-05-04 RX ORDER — SERTRALINE HYDROCHLORIDE 100 MG/1
100 TABLET, FILM COATED ORAL DAILY
COMMUNITY
Start: 2017-04-14

## 2017-05-04 RX ORDER — METFORMIN HYDROCHLORIDE 500 MG/1
500 TABLET, EXTENDED RELEASE ORAL 2 TIMES DAILY WITH MEALS
COMMUNITY
Start: 2017-01-30 | End: 2018-08-08

## 2017-05-04 RX ORDER — FUROSEMIDE 40 MG/1
TABLET ORAL
COMMUNITY
Start: 2017-05-03 | End: 2017-09-12 | Stop reason: SDUPTHER

## 2017-05-04 RX ORDER — INFLUENZA A VIRUS A/SINGAPORE/GP1908/2015 IVR-180 (H1N1) ANTIGEN (MDCK CELL DERIVED, PROPIOLACTONE INACTIVATED), INFLUENZA A VIRUS A/NORTH CAROLINA/04/2016 (H3N2) HEMAGGLUTININ ANTIGEN (MDCK CELL DERIVED, PROPIOLACTONE INACTIVATED), INFLUENZA B VIRUS B/IOWA/06/2017 HEMAGGLUTININ ANTIGEN (MDCK CELL DERIVED, PROPIOLACTONE INACTIVATED), INFLUENZA B VIRUS B/SINGAPORE/INFTT-16-0610/2016 HEMAGGLUTININ ANTIGEN (MDCK CELL DERIVED, PROPIOLACTONE INACTIVATED) 15; 15; 15; 15 UG/.5ML; UG/.5ML; UG/.5ML; UG/.5ML
INJECTION, SUSPENSION INTRAMUSCULAR
COMMUNITY
Start: 2017-03-06 | End: 2017-08-25

## 2017-05-17 ENCOUNTER — OFFICE VISIT (OUTPATIENT)
Dept: SURGERY | Facility: CLINIC | Age: 32
End: 2017-05-17

## 2017-05-17 VITALS
WEIGHT: 293 LBS | SYSTOLIC BLOOD PRESSURE: 142 MMHG | BODY MASS INDEX: 51.91 KG/M2 | TEMPERATURE: 97.7 F | OXYGEN SATURATION: 99 % | DIASTOLIC BLOOD PRESSURE: 90 MMHG | RESPIRATION RATE: 16 BRPM | HEIGHT: 63 IN | HEART RATE: 85 BPM

## 2017-05-17 DIAGNOSIS — Z90.49 S/P CHOLECYSTECTOMY: Primary | ICD-10-CM

## 2017-05-17 PROCEDURE — 99024 POSTOP FOLLOW-UP VISIT: CPT | Performed by: SURGERY

## 2017-08-25 ENCOUNTER — OFFICE VISIT (OUTPATIENT)
Dept: RETAIL CLINIC | Facility: CLINIC | Age: 32
End: 2017-08-25

## 2017-08-25 VITALS
BODY MASS INDEX: 51.91 KG/M2 | WEIGHT: 293 LBS | TEMPERATURE: 99.5 F | HEART RATE: 90 BPM | RESPIRATION RATE: 18 BRPM | HEIGHT: 63 IN | OXYGEN SATURATION: 99 %

## 2017-08-25 DIAGNOSIS — L40.9 PSORIASIS: ICD-10-CM

## 2017-08-25 DIAGNOSIS — R22.0 LIP SWELLING: Primary | ICD-10-CM

## 2017-08-25 PROCEDURE — 99213 OFFICE O/P EST LOW 20 MIN: CPT | Performed by: NURSE PRACTITIONER

## 2017-08-25 RX ORDER — PREDNISONE 20 MG/1
20 TABLET ORAL 3 TIMES DAILY
Qty: 9 TABLET | Refills: 0 | Status: SHIPPED | OUTPATIENT
Start: 2017-08-25 | End: 2017-08-28

## 2017-08-25 RX ORDER — LORATADINE 10 MG/1
10 TABLET ORAL DAILY
Qty: 30 TABLET | Refills: 0 | Status: SHIPPED | OUTPATIENT
Start: 2017-08-25 | End: 2017-09-24

## 2017-08-25 NOTE — PROGRESS NOTES
Subjective   Lynda Biswas is a 31 y.o. female.     HPI Comments: Bottom lip started swelling yesterday on left side, slowly went across entire bottom lip. Today it feels like the top lip is swelling as well. Has had lip swelling in the past, but denies history of itching and burning feeling. Denies recent sun exposure or no known irritant.    Facial Swelling   This is a new problem. The current episode started yesterday. The problem occurs constantly. The problem has been gradually worsening. Associated symptoms include chills (last night). Pertinent negatives include no abdominal pain, change in bowel habit, chest pain, congestion, coughing, diaphoresis, fatigue, fever, headaches, myalgias, nausea, neck pain, rash, sore throat, swollen glands, vomiting or weakness. Nothing aggravates the symptoms. She has tried nothing for the symptoms.        Current Outpatient Prescriptions on File Prior to Visit   Medication Sig Dispense Refill   • Blood Glucose Monitoring Suppl (FREESTYLE LITE) device      • busPIRone (BUSPAR) 5 MG tablet Take 5 mg by mouth 3 (Three) Times a Day.     • cyclobenzaprine (FLEXERIL) 10 MG tablet      • FREESTYLE LITE test strip      • furosemide (LASIX) 40 MG tablet      • gabapentin (NEURONTIN) 300 MG capsule Take 300 mg by mouth 3 (Three) Times a Day.     • ibuprofen (ADVIL,MOTRIN) 800 MG tablet Take 1 tablet by mouth Every 6 (Six) Hours As Needed for Mild Pain (1-3) or Moderate Pain (4-6). 15 tablet 0   • Lancets (FREESTYLE) lancets      • lisinopril-hydrochlorothiazide (PRINZIDE,ZESTORETIC) 20-12.5 MG per tablet Take 1 tablet by mouth Daily.     • metFORMIN ER (GLUCOPHAGE-XR) 500 MG 24 hr tablet Take 500 mg by mouth 2 (Two) Times a Day With Meals.     • norgestimate-ethinyl estradiol (SPRINTEC 28) 0.25-35 MG-MCG per tablet Take 1 tablet by mouth Daily.     • oxybutynin XL (DITROPAN-XL) 10 MG 24 hr tablet Take 10 mg by mouth Daily.     • sertraline (ZOLOFT) 50 MG tablet Take 50 mg by  mouth Daily.     • [DISCONTINUED] docusate sodium (COLACE) 100 MG capsule Take 1 capsule by mouth 2 (Two) Times a Day. 60 capsule 1   • [DISCONTINUED] FLUCELVAX QUADRIVALENT 0.5 ML suspension prefilled syringe injection        No current facility-administered medications on file prior to visit.        No Known Allergies    Past Medical History:   Diagnosis Date   • Abdominal pain    • Anxiety    • Arthritis    • Bladder spasms    • Depression    • Diabetes    • Fatty liver    • Gall stones    • GERD (gastroesophageal reflux disease)    • Hard of hearing     RIGHT SIDE   • Hypertension    • Nausea     AFTER EATING   • PEDRO on CPAP    • PCOS (polycystic ovarian syndrome)    • Psoriasis    • Wears glasses        Past Surgical History:   Procedure Laterality Date   • APPENDECTOMY     • CA LAP,CHOLECYSTECTOMY N/A 4/17/2017    Procedure: CHOLECYSTECTOMY LAPAROSCOPIC CONVERTED TO OPEN WITH INTRAOPERATIVE CHOLEANGIOGRAM;  Surgeon: Keysha Keenan MD;  Location: [facility];  Service: General       Family History   Problem Relation Age of Onset   • Arthritis Other    • Cancer Other    • Diabetes Other    • Hypertension Other    • Kidney disease Other    • Obesity Other    • Hypertension Mother    • Kidney disease Maternal Aunt        Social History     Social History   • Marital status: Single     Spouse name: N/A   • Number of children: N/A   • Years of education: N/A     Occupational History   • Not on file.     Social History Main Topics   • Smoking status: Current Some Day Smoker     Packs/day: 0.25     Types: Cigarettes   • Smokeless tobacco: Never Used   • Alcohol use Yes      Comment: occassionally   • Drug use: No   • Sexual activity: Defer      Comment: pills to help regulate menst. cycle.     Other Topics Concern   • Not on file     Social History Narrative       Review of Systems   Constitutional: Positive for chills (last night). Negative for activity change, diaphoresis, fatigue and fever.   HENT:  "Positive for facial swelling. Negative for congestion, ear pain, rhinorrhea, sinus pressure, sneezing and sore throat.    Respiratory: Negative for cough.    Cardiovascular: Negative for chest pain.   Gastrointestinal: Negative for abdominal pain, change in bowel habit, diarrhea, nausea and vomiting.   Musculoskeletal: Negative for myalgias and neck pain.   Skin: Negative for rash.   Neurological: Negative for dizziness, weakness and headaches.       Pulse 90  Temp 99.5 °F (37.5 °C)  Resp 18  Ht 62.5\" (158.8 cm)  Wt (!) 348 lb (158 kg)  LMP 08/25/2017  SpO2 99%  BMI 62.64 kg/m2    Objective   Physical Exam   Constitutional: She is oriented to person, place, and time. She appears well-developed and well-nourished. She is cooperative.   HENT:   Head: Normocephalic.   Right Ear: Tympanic membrane, external ear and ear canal normal.   Left Ear: Tympanic membrane, external ear and ear canal normal.   Nose: Right sinus exhibits no maxillary sinus tenderness and no frontal sinus tenderness. Left sinus exhibits no maxillary sinus tenderness and no frontal sinus tenderness.   Mouth/Throat: Uvula is midline, oropharynx is clear and moist and mucous membranes are normal. No tonsillar exudate.       Eyes: Conjunctivae, EOM and lids are normal.   Cardiovascular: Normal rate, regular rhythm and normal heart sounds.    Pulmonary/Chest: Effort normal and breath sounds normal. No accessory muscle usage. No respiratory distress.   Lymphadenopathy:     She has no cervical adenopathy.   Neurological: She is alert and oriented to person, place, and time.   Skin: Skin is warm, dry and intact.        Psychiatric: She has a normal mood and affect. Her speech is normal and behavior is normal.         Assessment/Plan   Lynda was seen today for facial swelling.    Diagnoses and all orders for this visit:    Lip swelling  -     predniSONE (DELTASONE) 20 MG tablet; Take 1 tablet by mouth 3 (Three) Times a Day for 3 days.  -     " loratadine (CLARITIN) 10 MG tablet; Take 1 tablet by mouth Daily for 30 days.    Psoriasis  -     triamcinolone (KENALOG) 0.1 % ointment; Apply  topically 2 (Two) Times a Day As Needed for Rash for up to 7 days.          Follow up with PCP or go to the nearest emergency room if symptoms worsen or fail to improve.

## 2017-08-25 NOTE — PATIENT INSTRUCTIONS
Psoriasis  Psoriasis is a long-term (chronic) condition of skin inflammation. It occurs because your immune system causes skin cells to form too quickly. As a result, too many skin cells grow and create raised, red patches (plaques) that look silvery on your skin. Plaques may appear anywhere on your body. They can be any size or shape.  Psoriasis can come and go. The condition varies from mild to very severe. It cannot be passed from one person to another (not contagious).   CAUSES   The cause of psoriasis is not known, but certain factors can make the condition worse. These include:   · Damage or trauma to the skin, such as cuts, scrapes, sunburn, and dryness.  · Lack of sunlight.  · Certain medicines.  · Alcohol.  · Tobacco use.  · Stress.  · Infections caused by bacteria or viruses.  RISK FACTORS  This condition is more likely to develop in:  · People with a family history of psoriasis.  · People who are .  · People who are between the ages of 15-30 and 50-60 years old.  SYMPTOMS   There are five different types of psoriasis. You can have more than one type of psoriasis during your life. Types are:   · Plaque.  · Guttate.  · Inverse.  · Pustular.  · Erythrodermic.  Each type of psoriasis has different symptoms.   · Plaque psoriasis symptoms include red, raised plaques with a silvery white coating (scale). These plaques may be itchy. Your nails may be pitted and crumbly or fall off.  · Guttate psoriasis symptoms include small red spots that often show up on your trunk, arms, and legs. These spots may develop after you have been sick, especially with strep throat.  · Inverse psoriasis symptoms include plaques in your underarm area, under your breasts, or on your genitals, groin, or buttocks.  · Pustular psoriasis symptoms include pus-filled bumps that are painful, red, and swollen on the palms of your hands or the soles of your feet. You also may feel exhausted, feverish, weak, or have no  appetite.  · Erythrodermic psoriasis symptoms include bright red skin that may look burned. You may have a fast heartbeat and a body temperature that is too high or too low. You may be itchy or in pain.  DIAGNOSIS   Your health care provider may suspect psoriasis based on your symptoms and family history. Your health care provider will also do a physical exam. This may include a procedure to remove a tissue sample (biopsy) for testing. You may also be referred to a health care provider who specializes in skin diseases (dermatologist).   TREATMENT  There is no cure for this condition, but treatment can help manage it. Goals of treatment include:   · Helping your skin heal.  · Reducing itching and inflammation.  · Slowing the growth of new skin cells.  · Helping your immune system respond better to your skin.  Treatment varies, depending on the severity of your condition. Treatment may include:   · Creams or ointments.  · Ultraviolet ray exposure (light therapy). This may include natural sunlight or light therapy in a medical office.  · Medicines (systemic therapy). These medicines can help your body better manage skin cell turnover and inflammation. They may be used along with light therapy or ointments. You may also get antibiotic medicines if you have an infection.  HOME CARE INSTRUCTIONS  Skin Care   · Moisturize your skin as needed. Only use moisturizers that have been approved by your health care provider.    · Apply cool compresses to the affected areas.    · Do not scratch your skin.    Lifestyle   · Do not use tobacco products. This includes cigarettes, chewing tobacco, and e-cigarettes. If you need help quitting, ask your health care provider.  · Drink little or no alcohol.     · Try techniques for stress reduction, such as meditation or yoga.  · Get exposure to the sun as told by your health care provider. Do not get sunburned.    · Consider joining a psoriasis support group.    Medicines   · Take or use  over-the-counter and prescription medicines only as told by your health care provider.  · If you were prescribed an antibiotic, take or use it as told by your health care provider. Do not stop taking the antibiotic even if your condition starts to improve.  General Instructions   · Keep a journal to help track what triggers an outbreak. Try to avoid any triggers.    · See a counselor or  if feelings of sadness, frustration, and hopelessness about your condition are interfering with your work and relationships.  · Keep all follow-up visits as told by your health care provider. This is important.  SEEK MEDICAL CARE IF:  · Your pain gets worse.  · You have increasing redness or warmth in the affected areas.    · You have new or worsening pain or stiffness in your joints.  · Your nails start to break easily or pull away from the nail bed.    · You have a fever.    · You feel depressed.     This information is not intended to replace advice given to you by your health care provider. Make sure you discuss any questions you have with your health care provider.     Document Released: 12/15/2001 Document Revised: 09/07/2016 Document Reviewed: 05/04/2016  [employer] Interactive Patient Education ©2017 [employer]

## 2017-09-01 ENCOUNTER — OFFICE VISIT (OUTPATIENT)
Dept: SURGERY | Facility: CLINIC | Age: 32
End: 2017-09-01

## 2017-09-01 VITALS
WEIGHT: 293 LBS | HEIGHT: 63 IN | TEMPERATURE: 97.9 F | HEART RATE: 116 BPM | BODY MASS INDEX: 51.91 KG/M2 | OXYGEN SATURATION: 99 %

## 2017-09-01 DIAGNOSIS — R10.11 RIGHT UPPER QUADRANT ABDOMINAL PAIN: Primary | ICD-10-CM

## 2017-09-01 DIAGNOSIS — K43.2 INCISIONAL HERNIA, WITHOUT OBSTRUCTION OR GANGRENE: ICD-10-CM

## 2017-09-01 PROCEDURE — 99213 OFFICE O/P EST LOW 20 MIN: CPT | Performed by: SURGERY

## 2017-09-01 NOTE — PROGRESS NOTES
Patient: Lynda Biswas    YOB: 1985    Date: 09/01/2017    Primary Care Provider: NICHOLAS Oliver    Reason for Consultation: Follow-up lap anayeli    Chief Complaint:   Chief Complaint   Patient presents with   • Post-op     Lap Anayeli       History of present illness:  I saw the patient in the office today as a followup from their recent laparoscopic cholecystectomy.  Patient c/o redness, warmth, and swelling along incision site. Patient denies pain, fever, and nausea.Patient has apparent incisional hernia, no evidence of redness or infection or cellulitis.  No evidence of bowel obstruction.  No nausea vomiting.  Ultrasound revealed a large incisional hernia    Vital Signs:   Temp:  [97.9 °F (36.6 °C)] 97.9 °F (36.6 °C)  Heart Rate:  [116] 116    Physical Exam:   General Appearance:    Alert, cooperative, in no acute distress   Abdomen:     no masses, no organomegaly, soft non-tender, non-distended, no guarding, wounds are well healed     Assessment / Plan :    1. Right upper quadrant abdominal pain    2. Incisional hernia, without obstruction or gangrene      Discussed diagnoses with patient, she will follow-up with Dr. Keenan next week to be evaluated for possible repair of hernia.  She understands and is agreeable.    Electronically signed by Wiliam Jauregui MD  09/01/17              Scribed for Wiliam Jauregui MD by Yenifer Mcneill. 9/1/2017  2:27 PM

## 2017-09-07 ENCOUNTER — OFFICE VISIT (OUTPATIENT)
Dept: SURGERY | Facility: CLINIC | Age: 32
End: 2017-09-07

## 2017-09-07 VITALS
DIASTOLIC BLOOD PRESSURE: 90 MMHG | HEIGHT: 63 IN | WEIGHT: 293 LBS | BODY MASS INDEX: 51.91 KG/M2 | HEART RATE: 88 BPM | TEMPERATURE: 97.8 F | OXYGEN SATURATION: 98 % | SYSTOLIC BLOOD PRESSURE: 140 MMHG

## 2017-09-07 DIAGNOSIS — K43.2 INCISIONAL HERNIA, WITHOUT OBSTRUCTION OR GANGRENE: Primary | ICD-10-CM

## 2017-09-07 PROCEDURE — 99214 OFFICE O/P EST MOD 30 MIN: CPT | Performed by: SURGERY

## 2017-09-07 RX ORDER — HEPARIN SODIUM 5000 [USP'U]/ML
5000 INJECTION, SOLUTION INTRAVENOUS; SUBCUTANEOUS ONCE
Status: CANCELLED | OUTPATIENT
Start: 2017-09-07 | End: 2017-09-07

## 2017-09-07 RX ORDER — SODIUM CHLORIDE 9 MG/ML
50 INJECTION, SOLUTION INTRAVENOUS CONTINUOUS
Status: CANCELLED | OUTPATIENT
Start: 2017-09-07

## 2017-09-07 RX ORDER — FUROSEMIDE 20 MG/1
20 TABLET ORAL DAILY PRN
COMMUNITY
Start: 2017-07-26 | End: 2022-07-06

## 2017-09-07 RX ORDER — SODIUM CHLORIDE 0.9 % (FLUSH) 0.9 %
1-10 SYRINGE (ML) INJECTION AS NEEDED
Status: CANCELLED | OUTPATIENT
Start: 2017-09-07

## 2017-09-07 NOTE — PROGRESS NOTES
Subjective   Lynda Biswas is a 31 y.o. female.   Chief Complaint   Patient presents with   • Follow-up     follow up incisional hernia       History of Present Illness     Miss Biswas 30yo female known to me from a prior open cholecystectomy in April of this year for cholelithiasis with chronic cholecystitis.  She had a somewhat, perioperative course due to a difficult airway and remained intubated as a precaution postoperatively.  She recovered otherwise uneventfully but returned to the office recently and was seen and evaluated by Dr. Jauregui after she developed a right-sided abdominal bulge at the site of her previous incision.  She described pain and redness overlying this bulge and ultimately an ultrasound demonstrated a 5 cm defect in the fascia containing bowel.  She continues to have pain and reports frequent redness of the skin overlying the site of the bulge.  She does not have any obstructive symptoms.  She has never had a prior hernia repair.      Patient Active Problem List   Diagnosis   • MRSA (methicillin resistant Staphylococcus aureus) infection   • Incisional hernia, without obstruction or gangrene       Past Medical History:   Diagnosis Date   • Abdominal pain    • Anxiety    • Arthritis    • Bladder spasms    • Body piercing     EARS AND LEFT NIPPLE   • Depression    • Diabetes     TAKES ORAL MED, NO INSULIN   • Fatty liver    • Gall stones    • GERD (gastroesophageal reflux disease)    • Hard of hearing     RIGHT SIDE   • Hard to intubate     REPORTS DISCOVERED WITH LAP CHOLEY IN 2017, THAT SHE ENDED UP BEING AN OPEN CHOLEY AND WAS IN ICU AFTER PROCEDURE.   • Hearing loss     PATIENT REPORTS VERY MILD AND THAT SHE DOES NOT USE HEARING DEVICES   • Hypertension    • MRSA (methicillin resistant Staphylococcus aureus)     REPORTS 2-3 YEARS AGO IN LEFT BREAST AND THAT SHE WAS TREATED.   • Nausea     AFTER EATING   • PEDRO on CPAP     INSTRUCTED TO BRING IN DOS   • PCOS (polycystic ovarian  syndrome)    • Psoriasis    • Tattoo     X1   • Wears glasses          Past Surgical History:   Procedure Laterality Date   • APPENDECTOMY     • OTHER SURGICAL HISTORY      [place]ROWN TOE NAIL REMOVED   • [place] LAP,CHOLECYSTECTOMY N/A 4/17/2017    Procedure: CHOLECYSTECTOMY LAPAROSCOPIC CONVERTED TO OPEN WITH INTRAOPERATIVE CHOLEANGIOGRAM;  Surgeon: Keysha Keenan MD;  Location: [facility];  Service: General       No current facility-administered medications on file prior to visit.      Current Outpatient Prescriptions on File Prior to Visit   Medication Sig   • Blood Glucose Monitoring Suppl (FREESTYLE LITE) device    • busPIRone (BUSPAR) 5 MG tablet Take 5 mg by mouth 3 (Three) Times a Day.   • cyclobenzaprine (FLEXERIL) 10 MG tablet Take 10 mg by mouth 2 (Two) Times a Day As Needed for Muscle Spasms.   • FREESTYLE LITE test strip    • gabapentin (NEURONTIN) 300 MG capsule Take 300 mg by mouth 3 (Three) Times a Day.   • Lancets (FREESTYLE) lancets    • lisinopril-hydrochlorothiazide (PRINZIDE,ZESTORETIC) 20-12.5 MG per tablet Take 1 tablet by mouth Daily.   • loratadine (CLARITIN) 10 MG tablet Take 1 tablet by mouth Daily for 30 days. (Patient taking differently: Take 10 mg by mouth Daily As Needed.)   • metFORMIN ER (GLUCOPHAGE-XR) 500 MG 24 hr tablet Take 500 mg by mouth 2 (Two) Times a Day With Meals.   • norgestimate-ethinyl estradiol (SPRINTEC 28) 0.25-35 MG-MCG per tablet Take 1 tablet by mouth Daily.   • sertraline (ZOLOFT) 50 MG tablet Take 50 mg by mouth Daily.         No Known Allergies      Family History   Problem Relation Age of Onset   • Arthritis Other    • Cancer Other    • Diabetes Other    • Hypertension Other    • Kidney disease Other    • Obesity Other    • Hypertension Mother    • Kidney disease Maternal Aunt          Social History     Social History   • Marital status: Single     Spouse name: N/A   • Number of children: N/A   • Years of education: N/A     Occupational History   • Not  "on file.     Social History Main Topics   • Smoking status: Current Some Day Smoker     Packs/day: 0.25     Years: 10.00     Types: Cigarettes   • Smokeless tobacco: Never Used      Comment: REPORTS SMOKES VERY LITTLE   • Alcohol use Yes      Comment: SOCIAL USE, NO ABUSE REPORTED   • Drug use: No   • Sexual activity: Defer     Other Topics Concern   • Not on file     Social History Narrative         Review of Systems   Constitutional: Negative for chills, fever and unexpected weight change.   HENT: Negative for hearing loss, trouble swallowing and voice change.    Eyes: Negative for visual disturbance.   Respiratory: Negative for apnea, cough, chest tightness, shortness of breath and wheezing.    Cardiovascular: Negative for chest pain, palpitations and leg swelling.   Gastrointestinal: Positive for abdominal pain (right lower pain/pressure). Negative for abdominal distention, anal bleeding, blood in stool, constipation, diarrhea, nausea, rectal pain and vomiting.   Endocrine: Negative for cold intolerance and heat intolerance.   Genitourinary: Negative for difficulty urinating, dysuria and flank pain.   Musculoskeletal: Negative for back pain and gait problem.   Skin: Negative for color change, rash and wound.   Neurological: Negative for dizziness, syncope, speech difficulty, weakness, light-headedness, numbness and headaches.   Hematological: Negative for adenopathy. Does not bruise/bleed easily.   Psychiatric/Behavioral: Negative for confusion. The patient is not nervous/anxious.        Objective    /90  Pulse 88  Temp 97.8 °F (36.6 °C) (Temporal Artery )   Ht 62.5\" (158.8 cm)  Wt (!) 348 lb (158 kg)  LMP 08/25/2017  SpO2 98%  BMI 62.64 kg/m2    Physical Exam   Constitutional: She is oriented to person, place, and time. She appears well-developed.   Morbidly obese   HENT:   Head: Normocephalic and atraumatic.   Eyes: No scleral icterus.   Cardiovascular: Regular rhythm.    Pulmonary/Chest: Effort " normal.   Abdominal: Soft. She exhibits no distension. There is tenderness. A hernia is present.   Large hernia defect in the right subcostal incision.  This is reducible when the patient is supine.   Neurological: She is alert and oriented to person, place, and time.   Skin: Skin is warm and dry.   Psychiatric: She has a normal mood and affect. Her behavior is normal.           Imaging personally reviewed.  Chest X-ray 1 View    Result Date: 9/12/2017  No acute cardiopulmonary process.  Continued followup is recommended.  This report was finalized on 9/12/2017 10:37 AM by Giana Aguayo M.D..    Us Abdomen Limited    Result Date: 9/1/2017  Incisional hernia right upper quadrant containing bowel.        Assessment/Plan   Lynda was seen today for follow-up.    Diagnoses and all orders for this visit:    Incisional hernia, without obstruction or gangrene  -     Case Request; Standing  -     sodium chloride 0.9 % flush 1-10 mL; Infuse 1-10 mL into a venous catheter As Needed for Line Care.  -     ampicillin-sulbactam (UNASYN) 3 g in sodium chloride 0.9 % 100 mL IVPB; Infuse 3 g into a venous catheter 1 (One) Time.  -     heparin (porcine) 5000 UNIT/ML injection 5,000 Units; Inject 1 mL under the skin 1 (One) Time.  -     sodium chloride 0.9 % infusion; Infuse 50 mL/hr into a venous catheter Continuous.  -     Case Request    Other orders  -     Follow Anesthesia Guidelines / Standing Orders; Future  -     Provide NPO Instructions to Patient  -     Clorhexidine Skin Prep  -     Follow Anesthesia Guidelines / Standing Orders; Standing  -     Verify NPO Status; Standing  -     SCD (Sequential Compression Device) - Place on Patient in Pre-Op; Standing  -     Insert Peripheral IV; Standing  -     Saline Lock & Maintain IV Access; Standing      I had a detailed discussion in the office with Lynda regarding her incisional hernia.  Quite frankly, I'm not surprised that this developed given her automatically next of 62.   She and I have previously discussed the importance of weight loss for overall health and she was previously offered her referral to bariatric surgery.  She declined.  I did discuss with her at some length in the office today that repair of this hernia will include a 100% recurrence because of her obesity.  However given that she is describing significant pain and redness overlying the hernia I'm concerned that her restriction of bowel is in impending and given her known difficult airway, multiple medical comorbidities, and large size, I certainly do not want to be repairing this hernia in an emergency situation.  It would be ideal if she can lose enough weight to reach a body mass index of less than 40 before proceeding but this is simply not feasible unless she undergoes a weight loss surgery procedure.  However, even that would not resolve his of the risk of hernia complications while weight loss is ongoing.  I have advised her that we can proceed with hernia repair avoid complications of incarceration, strangulation, and obstruction, however, at her current weight this will not be a durable repair and will only temporize the situation while she proceeds with weight loss.  She verbalized understanding of this.  She wishes to proceed.  My office will make scheduling arrangements.  She understands that she will need to be nothing by mouth after midnight the evening prior to procedure.

## 2017-09-12 ENCOUNTER — HOSPITAL ENCOUNTER (OUTPATIENT)
Dept: GENERAL RADIOLOGY | Facility: HOSPITAL | Age: 32
Discharge: HOME | End: 2017-09-12
Admitting: SURGERY

## 2017-09-12 ENCOUNTER — APPOINTMENT (OUTPATIENT)
Dept: PREADMISSION TESTING | Facility: HOSPITAL | Age: 32
End: 2017-09-12

## 2017-09-12 ENCOUNTER — ANESTHESIA EVENT (OUTPATIENT)
Dept: PERIOP | Facility: HOSPITAL | Age: 32
End: 2017-09-12

## 2017-09-12 VITALS — HEIGHT: 63 IN | WEIGHT: 293 LBS | BODY MASS INDEX: 51.91 KG/M2

## 2017-09-12 LAB
ANION GAP SERPL CALCULATED.3IONS-SCNC: 14.3 MMOL/L
B-HCG UR QL: NEGATIVE
BILIRUB UR QL STRIP: NEGATIVE
BUN BLD-MCNC: 13 MG/DL (ref 7–20)
BUN/CREAT SERPL: 16.3 (ref 7.1–23.5)
CALCIUM SPEC-SCNC: 9.5 MG/DL (ref 8.4–10.2)
CHLORIDE SERPL-SCNC: 103 MMOL/L (ref 98–107)
CLARITY UR: ABNORMAL
CO2 SERPL-SCNC: 27 MMOL/L (ref 26–30)
COLOR UR: YELLOW
CREAT BLD-MCNC: 0.8 MG/DL (ref 0.6–1.3)
DEPRECATED RDW RBC AUTO: 45.8 FL (ref 37–54)
ERYTHROCYTE [DISTWIDTH] IN BLOOD BY AUTOMATED COUNT: 16.2 % (ref 11.5–14.5)
GFR SERPL CREATININE-BSD FRML MDRD: 101 ML/MIN/1.73
GLUCOSE BLD-MCNC: 117 MG/DL (ref 74–98)
GLUCOSE UR STRIP-MCNC: NEGATIVE MG/DL
HCT VFR BLD AUTO: 43.3 % (ref 37–47)
HGB BLD-MCNC: 13 G/DL (ref 12–16)
HGB UR QL STRIP.AUTO: NEGATIVE
KETONES UR QL STRIP: NEGATIVE
LEUKOCYTE ESTERASE UR QL STRIP.AUTO: NEGATIVE
MCH RBC QN AUTO: 23.7 PG (ref 27–31)
MCHC RBC AUTO-ENTMCNC: 30 G/DL (ref 30–37)
MCV RBC AUTO: 78.9 FL (ref 81–99)
NITRITE UR QL STRIP: NEGATIVE
PH UR STRIP.AUTO: 5.5 [PH] (ref 5–8)
PLATELET # BLD AUTO: 330 10*3/MM3 (ref 130–400)
PMV BLD AUTO: 9.9 FL (ref 6–12)
POTASSIUM BLD-SCNC: 4.3 MMOL/L (ref 3.5–5.1)
PROT UR QL STRIP: ABNORMAL
RBC # BLD AUTO: 5.49 10*6/MM3 (ref 4.2–5.4)
SODIUM BLD-SCNC: 140 MMOL/L (ref 137–145)
SP GR UR STRIP: >=1.03 (ref 1–1.03)
UROBILINOGEN UR QL STRIP: ABNORMAL
WBC NRBC COR # BLD: 9.46 10*3/MM3 (ref 4.8–10.8)

## 2017-09-12 PROCEDURE — 81003 URINALYSIS AUTO W/O SCOPE: CPT | Performed by: SURGERY

## 2017-09-12 PROCEDURE — 81025 URINE PREGNANCY TEST: CPT | Performed by: SURGERY

## 2017-09-12 PROCEDURE — 85027 COMPLETE CBC AUTOMATED: CPT | Performed by: SURGERY

## 2017-09-12 PROCEDURE — 80048 BASIC METABOLIC PNL TOTAL CA: CPT | Performed by: SURGERY

## 2017-09-12 PROCEDURE — 71010 HC CHEST PA OR AP: CPT

## 2017-09-12 PROCEDURE — 36415 COLL VENOUS BLD VENIPUNCTURE: CPT

## 2017-09-12 RX ORDER — TRIAMCINOLONE ACETONIDE 1 MG/G
1 CREAM TOPICAL DAILY PRN
COMMUNITY
End: 2021-08-12

## 2017-09-12 NOTE — PAT
AFTER OBTAINING HEALTH HISTORY, NOTED THAT PATIENT REPORTED A HX OF PEDRO WITH USE OF CPAP.  PATIENT ALSO REPORTED THAT WITH HER LAST SURGERY IN April OF 2017, THAT SHE WAS DIFFICULT TO INTUBATE AND THAT SHE WENT FROM BEING A LAP CHOLEY TO AN OPEN CHOLEY.  PATIENT REPORTED THAT SHE WAS ALSO IN ICU AFTER SURGERY.      PHONED ANDERW SMITH CRNA WITH INFORMATION REGARDING PATIENT'S HEALTH HX.  [facility] REVIEWED SURGERY FROM April OF 2017 AND SPOKE WITH PATIENT.  [facility] REPORTS THAT LAST SURGERY DID INDICATE THAT PATIENT WAS DIFFICULT TO INTUBATE AND REQUIRED 2 CRNA'S.  ANRDEW REQUESTED THAT A NOTE BE MADE TO INDICATE THAT THE SAME WOULD BE NEEDED FOR 09-18-17 PROCEDURE.    KRISTOPHER ESTEVES  ALSO NOTIFIED OF THE NEED FOR 2 CRNA'S FOR THIS CASE FOR 09-18-17.

## 2017-09-18 ENCOUNTER — HOSPITAL ENCOUNTER (OUTPATIENT)
Facility: HOSPITAL | Age: 32
Setting detail: SURGERY ADMIT
Discharge: HOME | End: 2017-09-18
Attending: SURGERY | Admitting: SURGERY

## 2017-09-18 ENCOUNTER — ANESTHESIA (OUTPATIENT)
Dept: PERIOP | Facility: HOSPITAL | Age: 32
End: 2017-09-18

## 2017-09-18 VITALS
SYSTOLIC BLOOD PRESSURE: 152 MMHG | TEMPERATURE: 98.6 F | OXYGEN SATURATION: 96 % | DIASTOLIC BLOOD PRESSURE: 94 MMHG | HEART RATE: 102 BPM | RESPIRATION RATE: 26 BRPM

## 2017-09-18 DIAGNOSIS — K43.2 INCISIONAL HERNIA, WITHOUT OBSTRUCTION OR GANGRENE: ICD-10-CM

## 2017-09-18 LAB
B-HCG UR QL: NEGATIVE
GLUCOSE BLDC GLUCOMTR-MCNC: 84 MG/DL (ref 70–130)

## 2017-09-18 PROCEDURE — 82962 GLUCOSE BLOOD TEST: CPT

## 2017-09-18 PROCEDURE — 25010000002 FENTANYL CITRATE (PF) 100 MCG/2ML SOLUTION: Performed by: NURSE ANESTHETIST, CERTIFIED REGISTERED

## 2017-09-18 PROCEDURE — 25010000002 PROPOFOL 200 MG/20ML EMULSION: Performed by: NURSE ANESTHETIST, CERTIFIED REGISTERED

## 2017-09-18 PROCEDURE — 25010000002 SUCCINYLCHOLINE PER 20 MG: Performed by: NURSE ANESTHETIST, CERTIFIED REGISTERED

## 2017-09-18 PROCEDURE — 81025 URINE PREGNANCY TEST: CPT | Performed by: SURGERY

## 2017-09-18 PROCEDURE — 25010000002 DEXAMETHASONE PER 1 MG

## 2017-09-18 PROCEDURE — 25010000002 MIDAZOLAM PER 1 MG: Performed by: NURSE ANESTHETIST, CERTIFIED REGISTERED

## 2017-09-18 PROCEDURE — 25010000002 HEPARIN (PORCINE) PER 1000 UNITS

## 2017-09-18 RX ORDER — SODIUM CHLORIDE 9 MG/ML
50 INJECTION, SOLUTION INTRAVENOUS CONTINUOUS
Status: DISCONTINUED | OUTPATIENT
Start: 2017-09-18 | End: 2017-09-18 | Stop reason: HOSPADM

## 2017-09-18 RX ORDER — DEXAMETHASONE SODIUM PHOSPHATE 10 MG/ML
8 INJECTION, SOLUTION INTRAMUSCULAR; INTRAVENOUS ONCE
Status: DISCONTINUED | OUTPATIENT
Start: 2017-09-18 | End: 2017-09-18 | Stop reason: HOSPADM

## 2017-09-18 RX ORDER — SODIUM CHLORIDE 0.9 % (FLUSH) 0.9 %
1-10 SYRINGE (ML) INJECTION AS NEEDED
Status: DISCONTINUED | OUTPATIENT
Start: 2017-09-18 | End: 2017-09-18 | Stop reason: HOSPADM

## 2017-09-18 RX ORDER — SODIUM CHLORIDE 0.9 % (FLUSH) 0.9 %
3 SYRINGE (ML) INJECTION AS NEEDED
Status: DISCONTINUED | OUTPATIENT
Start: 2017-09-18 | End: 2017-09-18 | Stop reason: HOSPADM

## 2017-09-18 RX ORDER — MIDAZOLAM HYDROCHLORIDE 1 MG/ML
INJECTION INTRAMUSCULAR; INTRAVENOUS AS NEEDED
Status: DISCONTINUED | OUTPATIENT
Start: 2017-09-18 | End: 2017-09-18 | Stop reason: SURG

## 2017-09-18 RX ORDER — PROPOFOL 10 MG/ML
INJECTION, EMULSION INTRAVENOUS AS NEEDED
Status: DISCONTINUED | OUTPATIENT
Start: 2017-09-18 | End: 2017-09-18 | Stop reason: SURG

## 2017-09-18 RX ORDER — LIDOCAINE HYDROCHLORIDE 10 MG/ML
INJECTION, SOLUTION INFILTRATION; PERINEURAL
Status: DISCONTINUED
Start: 2017-09-18 | End: 2017-09-18 | Stop reason: HOSPADM

## 2017-09-18 RX ORDER — SUCCINYLCHOLINE CHLORIDE 20 MG/ML
INJECTION INTRAMUSCULAR; INTRAVENOUS AS NEEDED
Status: DISCONTINUED | OUTPATIENT
Start: 2017-09-18 | End: 2017-09-18 | Stop reason: SURG

## 2017-09-18 RX ORDER — FENTANYL CITRATE 50 UG/ML
INJECTION, SOLUTION INTRAMUSCULAR; INTRAVENOUS AS NEEDED
Status: DISCONTINUED | OUTPATIENT
Start: 2017-09-18 | End: 2017-09-18 | Stop reason: SURG

## 2017-09-18 RX ORDER — DEXAMETHASONE SODIUM PHOSPHATE 4 MG/ML
INJECTION, SOLUTION INTRA-ARTICULAR; INTRALESIONAL; INTRAMUSCULAR; INTRAVENOUS; SOFT TISSUE
Status: COMPLETED
Start: 2017-09-18 | End: 2017-09-18

## 2017-09-18 RX ORDER — GLYCOPYRROLATE 0.2 MG/ML
INJECTION INTRAMUSCULAR; INTRAVENOUS AS NEEDED
Status: DISCONTINUED | OUTPATIENT
Start: 2017-09-18 | End: 2017-09-18 | Stop reason: SURG

## 2017-09-18 RX ORDER — HEPARIN SODIUM 5000 [USP'U]/ML
INJECTION, SOLUTION INTRAVENOUS; SUBCUTANEOUS
Status: COMPLETED
Start: 2017-09-18 | End: 2017-09-18

## 2017-09-18 RX ORDER — BUPIVACAINE HYDROCHLORIDE 2.5 MG/ML
INJECTION, SOLUTION EPIDURAL; INFILTRATION; INTRACAUDAL
Status: DISCONTINUED
Start: 2017-09-18 | End: 2017-09-18 | Stop reason: HOSPADM

## 2017-09-18 RX ORDER — HEPARIN SODIUM 5000 [USP'U]/ML
5000 INJECTION, SOLUTION INTRAVENOUS; SUBCUTANEOUS ONCE
Status: COMPLETED | OUTPATIENT
Start: 2017-09-18 | End: 2017-09-18

## 2017-09-18 RX ADMIN — DEXAMETHASONE SODIUM PHOSPHATE 8 MG: 4 INJECTION, SOLUTION INTRAMUSCULAR; INTRAVENOUS at 11:50

## 2017-09-18 RX ADMIN — MIDAZOLAM HYDROCHLORIDE 2 MG: 1 INJECTION, SOLUTION INTRAMUSCULAR; INTRAVENOUS at 11:21

## 2017-09-18 RX ADMIN — HEPARIN SODIUM 5000 UNITS: 5000 INJECTION, SOLUTION INTRAVENOUS; SUBCUTANEOUS at 11:00

## 2017-09-18 RX ADMIN — SUCCINYLCHOLINE CHLORIDE 200 MG: 20 INJECTION, SOLUTION INTRAMUSCULAR; INTRAVENOUS at 11:30

## 2017-09-18 RX ADMIN — LIDOCAINE HYDROCHLORIDE 100 MG: 20 INJECTION, SOLUTION INTRAVENOUS at 11:30

## 2017-09-18 RX ADMIN — GLYCOPYRROLATE 0.2 MG: 0.2 INJECTION, SOLUTION INTRAMUSCULAR; INTRAVENOUS at 11:42

## 2017-09-18 RX ADMIN — FENTANYL CITRATE 100 MCG: 50 INJECTION, SOLUTION INTRAMUSCULAR; INTRAVENOUS at 11:30

## 2017-09-18 RX ADMIN — SODIUM CHLORIDE 50 ML/HR: 9 INJECTION, SOLUTION INTRAVENOUS at 11:01

## 2017-09-18 RX ADMIN — PROPOFOL 250 MG: 10 INJECTION, EMULSION INTRAVENOUS at 11:30

## 2017-09-18 NOTE — ANESTHESIA POSTPROCEDURE EVALUATION
Patient: Lynda Biswas    Procedure Summary     Date Anesthesia Start Anesthesia Stop Room / Location    09/18/17 1120  [facility] OR 5 / [facility] OR       Procedure Diagnosis Surgeon Provider    INCISIONAL HERNIA REPAIR WITH MESH  (patient with known difficult airway) (N/A Abdomen) Incisional hernia, without obstruction or gangrene  (Incisional hernia, without obstruction or gangrene [K43.2]) MD Oumar Wilkes CRNA          Anesthesia Type: general  Last vitals  BP     149\99   Temp     99.7   Pulse    107   Resp     26   SpO2     100     Post Anesthesia Care and Evaluation    Patient location during evaluation: PACU  Patient participation: complete - patient participated  Level of consciousness: awake and alert  Pain score: 0  Pain management: satisfactory to patient  Airway patency: patent  Anesthetic complications: No anesthetic complications  PONV Status: none  Cardiovascular status: acceptable and stable  Respiratory status: acceptable and face mask  Hydration status: acceptable

## 2017-09-18 NOTE — PROGRESS NOTES
Lynda was scheduled today for repair of an incisional hernia in the right upper quadrant.  She has a known history of a difficult airway and was preoperatively counseled regarding the risks associated with this.  During her last procedure, she was successfully intubated using the glide scope.  This was in the room prior to induction.  There were also 3 anesthesia providers at the bedside in preparation for this patient with a known difficult airway.  She was prepared for surgery and after being taken back to the operating room, anesthesia was induced.  Direct laryngoscopy proved to be quite difficult and visualization of patient's vocal cords.  She was noted to have very friable tissue in the way which led to bleeding which further secured the view.  Attempts were made at intubation x 2 with the glide scope and were unsuccessful.  The patient was noted to have very little reserve and quickly desaturated.  She was difficult to bag and an LMA was then successfully placed.  At that point, it was elected to abort the planned surgical procedure the patient to recover from her induction of anesthesia.  Once she had fully awakened from anesthesia, she was extubated in the room without difficulty.  She was then transported to the recovery room in good condition.  The decision making was then explained in detail to the patient and her mother.  I have once again emphasized both of them that weight loss is imperative for this patient's overall health.  It is certainly contributing to multiple medical comorbidities, as well as increasing her risk for any surgical procedure and contributing to her difficult airway.  It is likely in her best interest that she be referred to bariatric surgery for further evaluation and I would imagine that during her next procedure of any type, she would need an awake fiberoptic intubation.  She would also likely benefit from this being performed at a larger facility with greater capabilities to  deal with a super morbidly obese patient with this type of airway difficulty.  She has been provided with a letter documenting her difficult airway.  I will see her back in my office for routine follow-up in 1 week.

## 2017-09-18 NOTE — PLAN OF CARE
Problem: Perioperative Period (Adult)  Goal: Signs and Symptoms of Listed Potential Problems Will be Absent or Manageable (Perioperative Period)  Outcome: Ongoing (interventions implemented as appropriate)    09/18/17 9848   Perioperative Period   Problems Assessed (Perioperative Period) all   Problems Present (Perioperative Period) none

## 2017-09-18 NOTE — PLAN OF CARE
Problem: Perioperative Period (Adult)  Intervention: Promote Pulmonary Hygiene and Secretion Clearance    09/18/17 1200   Promote Aggressive Pulmonary Hygiene/Secretion Management   Cough And Deep Breathing done with encouragement   Positioning   Head Of Bed (HOB) Position HOB elevated   Activity   Activity Type activity adjusted per tolerance;dorsiflexion, plantar flexion encouraged       Intervention: Monitor/Manage Pain    09/18/17 1200   Safety Interventions   Medication Review/Management medications reviewed   Manage Acute Burn Pain   Bowel Intervention adequate fluid intake promoted   Pain Management Interventions pain care plan reviewed with patient/caregiver;relaxation techniques promoted       Intervention: Promote Normothermia    09/18/17 1200   Cardiac Interventions   Warming Thermoregulation Maintenance warm blankets applied         Goal: Signs and Symptoms of Listed Potential Problems Will be Absent or Manageable (Perioperative Period)  Outcome: Ongoing (interventions implemented as appropriate)    09/18/17 1200   Perioperative Period   Problems Assessed (Perioperative Period) all   Problems Present (Perioperative Period) none

## 2017-09-18 NOTE — H&P (VIEW-ONLY)
Subjective   Lynda Biswas is a 31 y.o. female.   Chief Complaint   Patient presents with   • Follow-up     follow up incisional hernia       History of Present Illness     Miss Biswas 30yo female known to me from a prior open cholecystectomy in April of this year for cholelithiasis with chronic cholecystitis.  She had a somewhat, perioperative course due to a difficult airway and remained intubated as a precaution postoperatively.  She recovered otherwise uneventfully but returned to the office recently and was seen and evaluated by Dr. Jauregui after she developed a right-sided abdominal bulge at the site of her previous incision.  She described pain and redness overlying this bulge and ultimately an ultrasound demonstrated a 5 cm defect in the fascia containing bowel.  She continues to have pain and reports frequent redness of the skin overlying the site of the bulge.  She does not have any obstructive symptoms.  She has never had a prior hernia repair.      Patient Active Problem List   Diagnosis   • MRSA (methicillin resistant Staphylococcus aureus) infection   • Incisional hernia, without obstruction or gangrene       Past Medical History:   Diagnosis Date   • Abdominal pain    • Anxiety    • Arthritis    • Bladder spasms    • Body piercing     EARS AND LEFT NIPPLE   • Depression    • Diabetes     TAKES ORAL MED, NO INSULIN   • Fatty liver    • Gall stones    • GERD (gastroesophageal reflux disease)    • Hard of hearing     RIGHT SIDE   • Hard to intubate     REPORTS DISCOVERED WITH LAP CHOLEY IN 2017, THAT SHE ENDED UP BEING AN OPEN CHOLEY AND WAS IN ICU AFTER PROCEDURE.   • Hearing loss     PATIENT REPORTS VERY MILD AND THAT SHE DOES NOT USE HEARING DEVICES   • Hypertension    • MRSA (methicillin resistant Staphylococcus aureus)     REPORTS 2-3 YEARS AGO IN LEFT BREAST AND THAT SHE WAS TREATED.   • Nausea     AFTER EATING   • PEDRO on CPAP     INSTRUCTED TO BRING IN DOS   • PCOS (polycystic ovarian  syndrome)    • Psoriasis    • Tattoo     X1   • Wears glasses          Past Surgical History:   Procedure Laterality Date   • APPENDECTOMY     • OTHER SURGICAL HISTORY      [place]ROWN TOE NAIL REMOVED   • [place] LAP,CHOLECYSTECTOMY N/A 4/17/2017    Procedure: CHOLECYSTECTOMY LAPAROSCOPIC CONVERTED TO OPEN WITH INTRAOPERATIVE CHOLEANGIOGRAM;  Surgeon: Keysha Keenan MD;  Location: [facility] [place];  Service: General       No current facility-administered medications on file prior to visit.      Current Outpatient Prescriptions on File Prior to Visit   Medication Sig   • Blood Glucose Monitoring Suppl (FREESTYLE LITE) device    • busPIRone (BUSPAR) 5 MG tablet Take 5 mg by mouth 3 (Three) Times a Day.   • cyclobenzaprine (FLEXERIL) 10 MG tablet Take 10 mg by mouth 2 (Two) Times a Day As Needed for Muscle Spasms.   • FREESTYLE LITE test strip    • gabapentin (NEURONTIN) 300 MG capsule Take 300 mg by mouth 3 (Three) Times a Day.   • Lancets (FREESTYLE) lancets    • lisinopril-hydrochlorothiazide (PRINZIDE,ZESTORETIC) 20-12.5 MG per tablet Take 1 tablet by mouth Daily.   • loratadine (CLARITIN) 10 MG tablet Take 1 tablet by mouth Daily for 30 days. (Patient taking differently: Take 10 mg by mouth Daily As Needed.)   • metFORMIN ER (GLUCOPHAGE-XR) 500 MG 24 hr tablet Take 500 mg by mouth 2 (Two) Times a Day With Meals.   • norgestimate-ethinyl estradiol (SPRINTEC 28) 0.25-35 MG-MCG per tablet Take 1 tablet by mouth Daily.   • sertraline (ZOLOFT) 50 MG tablet Take 50 mg by mouth Daily.         No Known Allergies      Family History   Problem Relation Age of Onset   • Arthritis Other    • Cancer Other    • Diabetes Other    • Hypertension Other    • Kidney disease Other    • Obesity Other    • Hypertension Mother    • Kidney disease Maternal Aunt          Social History     Social History   • Marital status: Single     Spouse name: N/A   • Number of children: N/A   • Years of education: N/A     Occupational History   • Not  "on file.     Social History Main Topics   • Smoking status: Current Some Day Smoker     Packs/day: 0.25     Years: 10.00     Types: Cigarettes   • Smokeless tobacco: Never Used      Comment: REPORTS SMOKES VERY LITTLE   • Alcohol use Yes      Comment: SOCIAL USE, NO ABUSE REPORTED   • Drug use: No   • Sexual activity: Defer     Other Topics Concern   • Not on file     Social History Narrative         Review of Systems   Constitutional: Negative for chills, fever and unexpected weight change.   HENT: Negative for hearing loss, trouble swallowing and voice change.    Eyes: Negative for visual disturbance.   Respiratory: Negative for apnea, cough, chest tightness, shortness of breath and wheezing.    Cardiovascular: Negative for chest pain, palpitations and leg swelling.   Gastrointestinal: Positive for abdominal pain (right lower pain/pressure). Negative for abdominal distention, anal bleeding, blood in stool, constipation, diarrhea, nausea, rectal pain and vomiting.   Endocrine: Negative for cold intolerance and heat intolerance.   Genitourinary: Negative for difficulty urinating, dysuria and flank pain.   Musculoskeletal: Negative for back pain and gait problem.   Skin: Negative for color change, rash and wound.   Neurological: Negative for dizziness, syncope, speech difficulty, weakness, light-headedness, numbness and headaches.   Hematological: Negative for adenopathy. Does not bruise/bleed easily.   Psychiatric/Behavioral: Negative for confusion. The patient is not nervous/anxious.        Objective    /90  Pulse 88  Temp 97.8 °F (36.6 °C) (Temporal Artery )   Ht 62.5\" (158.8 cm)  Wt (!) 348 lb (158 kg)  LMP 08/25/2017  SpO2 98%  BMI 62.64 kg/m2    Physical Exam   Constitutional: She is oriented to person, place, and time. She appears well-developed.   Morbidly obese   HENT:   Head: Normocephalic and atraumatic.   Eyes: No scleral icterus.   Cardiovascular: Regular rhythm.    Pulmonary/Chest: Effort " normal.   Abdominal: Soft. She exhibits no distension. There is tenderness. A hernia is present.   Large hernia defect in the right subcostal incision.  This is reducible when the patient is supine.   Neurological: She is alert and oriented to person, place, and time.   Skin: Skin is warm and dry.   Psychiatric: She has a normal mood and affect. Her behavior is normal.           Imaging personally reviewed.  Chest X-ray 1 View    Result Date: 9/12/2017  No acute cardiopulmonary process.  Continued followup is recommended.  This report was finalized on 9/12/2017 10:37 AM by Giana Aguayo M.D..    Us Abdomen Limited    Result Date: 9/1/2017  Incisional hernia right upper quadrant containing bowel.        Assessment/Plan   Lynda was seen today for follow-up.    Diagnoses and all orders for this visit:    Incisional hernia, without obstruction or gangrene  -     Case Request; Standing  -     sodium chloride 0.9 % flush 1-10 mL; Infuse 1-10 mL into a venous catheter As Needed for Line Care.  -     ampicillin-sulbactam (UNASYN) 3 g in sodium chloride 0.9 % 100 mL IVPB; Infuse 3 g into a venous catheter 1 (One) Time.  -     heparin (porcine) 5000 UNIT/ML injection 5,000 Units; Inject 1 mL under the skin 1 (One) Time.  -     sodium chloride 0.9 % infusion; Infuse 50 mL/hr into a venous catheter Continuous.  -     Case Request    Other orders  -     Follow Anesthesia Guidelines / Standing Orders; Future  -     Provide NPO Instructions to Patient  -     Clorhexidine Skin Prep  -     Follow Anesthesia Guidelines / Standing Orders; Standing  -     Verify NPO Status; Standing  -     SCD (Sequential Compression Device) - Place on Patient in Pre-Op; Standing  -     Insert Peripheral IV; Standing  -     Saline Lock & Maintain IV Access; Standing      I had a detailed discussion in the office with Lynda regarding her incisional hernia.  Quite frankly, I'm not surprised that this developed given her automatically next of 62.   She and I have previously discussed the importance of weight loss for overall health and she was previously offered her referral to bariatric surgery.  She declined.  I did discuss with her at some length in the office today that repair of this hernia will include a 100% recurrence because of her obesity.  However given that she is describing significant pain and redness overlying the hernia I'm concerned that her restriction of bowel is in impending and given her known difficult airway, multiple medical comorbidities, and large size, I certainly do not want to be repairing this hernia in an emergency situation.  It would be ideal if she can lose enough weight to reach a body mass index of less than 40 before proceeding but this is simply not feasible unless she undergoes a weight loss surgery procedure.  However, even that would not resolve his of the risk of hernia complications while weight loss is ongoing.  I have advised her that we can proceed with hernia repair avoid complications of incarceration, strangulation, and obstruction, however, at her current weight this will not be a durable repair and will only temporize the situation while she proceeds with weight loss.  She verbalized understanding of this.  She wishes to proceed.  My office will make scheduling arrangements.  She understands that she will need to be nothing by mouth after midnight the evening prior to procedure.

## 2017-09-18 NOTE — DISCHARGE INSTRUCTIONS
To assist you in voiding:  Drink plenty of fluids  Listen to running water while attempting to void.  If you are unable to urinate and you have an uncomfortable urge to void or it has been   6 hours since you were discharged, return to the Emergency Room

## 2017-09-18 NOTE — SIGNIFICANT NOTE
JORGE LUIS IN OR WAITING ROOM AND PROMISE IN [facility] NOTIFIED OF PATIENT ARRIVAL TO PACU

## 2017-09-18 NOTE — PLAN OF CARE
Problem: Patient Care Overview (Adult)  Goal: Plan of Care Review  Outcome: Ongoing (interventions implemented as appropriate)    09/18/17 1247   Coping/Psychosocial Response Interventions   Plan Of Care Reviewed With patient   Patient Care Overview   Progress progress toward functional goals as expected   Outcome Evaluation   Outcome Summary/Follow up Plan VSS, PACU CARE COMPLETE, TO SDS IN STABLE CONDITION TO PREPARE FOR DISCHARGE

## 2017-09-18 NOTE — PLAN OF CARE
Problem: Perioperative Period (Adult)  Goal: Signs and Symptoms of Listed Potential Problems Will be Absent or Manageable (Perioperative Period)  Outcome: Outcome(s) achieved Date Met:  09/18/17 09/18/17 1302   Perioperative Period   Problems Assessed (Perioperative Period) all   Problems Present (Perioperative Period       09/18/17 1302   Perioperative Period   Problems Assessed (Perioperative Period) all   Problems Present (Perioperative Period) none   ) none

## 2017-09-18 NOTE — ANESTHESIA PROCEDURE NOTES
Airway  Urgency: elective    Difficult airway    General Information and Staff    Patient location during procedure: OR  CRNA: SANDIP LEDESMA    Indications and Patient Condition  Indications for airway management: airway protection    Preoxygenated: yes  Mask difficulty assessment: 4 - unable to mask vent +/- NMBA    Final Airway Details  Final airway type: endotracheal airway      Successful airway: ETT  Cuffed: yes   Successful intubation technique: video laryngoscopy  Endotracheal tube insertion site: oral  Blade: Fitz  Blade size: #4  ETT size: 7.0 mm  Cormack-Lehane Classification: grade IV - neither glottis nor epiglottis seen  Placement verified by: chest auscultation and capnometry   Measured from: lips  ETT to lips (cm): 21  Number of attempts at approach: 2    Additional Comments  Patient with known history of difficult intubation. RSI followed by video laryngscopy with glidescope. 100mm oral airway placed with inadequate ventilation using 2 anesthesia providers. Attempted glidescope intubation x 2 without success. Grade 4 view. Patient desaturated quickly Dr Keenan called to bedside and decision was made to wake patient up and cancel the procedure

## 2017-09-28 ENCOUNTER — OFFICE VISIT (OUTPATIENT)
Dept: SURGERY | Facility: CLINIC | Age: 32
End: 2017-09-28

## 2017-09-28 VITALS
HEIGHT: 63 IN | SYSTOLIC BLOOD PRESSURE: 150 MMHG | HEART RATE: 81 BPM | BODY MASS INDEX: 51.91 KG/M2 | OXYGEN SATURATION: 99 % | DIASTOLIC BLOOD PRESSURE: 90 MMHG | WEIGHT: 293 LBS | TEMPERATURE: 98.4 F

## 2017-09-28 DIAGNOSIS — K43.2 INCISIONAL HERNIA, WITHOUT OBSTRUCTION OR GANGRENE: ICD-10-CM

## 2017-09-28 DIAGNOSIS — E66.01 MORBID OBESITY WITH BMI OF 60.0-69.9, ADULT (HCC): Primary | ICD-10-CM

## 2017-09-28 PROCEDURE — 99212 OFFICE O/P EST SF 10 MIN: CPT | Performed by: SURGERY

## 2017-11-01 ENCOUNTER — OFFICE VISIT (OUTPATIENT)
Dept: SURGERY | Facility: CLINIC | Age: 32
End: 2017-11-01

## 2017-11-01 VITALS
HEIGHT: 63 IN | OXYGEN SATURATION: 99 % | SYSTOLIC BLOOD PRESSURE: 140 MMHG | HEART RATE: 85 BPM | TEMPERATURE: 98.6 F | WEIGHT: 293 LBS | BODY MASS INDEX: 51.91 KG/M2 | DIASTOLIC BLOOD PRESSURE: 82 MMHG

## 2017-11-01 DIAGNOSIS — E66.01 MORBID OBESITY WITH BMI OF 60.0-69.9, ADULT (HCC): Primary | ICD-10-CM

## 2017-11-01 DIAGNOSIS — K43.2 INCISIONAL HERNIA, WITHOUT OBSTRUCTION OR GANGRENE: ICD-10-CM

## 2017-11-01 PROCEDURE — 99214 OFFICE O/P EST MOD 30 MIN: CPT | Performed by: SURGERY

## 2017-11-01 NOTE — PROGRESS NOTES
"Subjective   Lynda Biswas is a 32 y.o. female.   Chief Complaint   Patient presents with   • Follow-up       History of Present Illness   Lynda comes to the office today for follow-up regarding her incisional hernia.  Recall that she underwent an open cholecystectomy which was quite difficult and was further complicated by her difficult airway.  She went on to develop an incisional hernia and was quite symptomatic from this, presenting to the office complaining of significant pain and redness overlying the hernia.  I was initially concerned for incarceration, and agreed to proceed with hernia repair, with the understanding that her risk for recurrence was 100% due to her body mass index.  However, on the day of the planned surgical procedure, she was found to have worsening of a known difficult airway and could not be successfully intubated by the anesthesia service.  Therefore, she was awakened from anesthesia and the procedure was canceled.  Since that time, she and I had multiple discussions in the office regarding these events, and I have continued to discuss with her the need for weight loss.  I have previously offered her referrals to bariatric surgery and I have personally attempted to arrange appointments with a medically-assisted weight loss clinic.  She has also been referred to the [facility] dietitians for outpatient counseling and has failed to keep those appointments.  She has declined a bariatric surgery referrals in the past.  I have not been able to find a weight loss provider locally that excepts her insurance.  She is unable to pay out of pocket.    Today, she complains of occasional discomfort over the hernia and has occasional cramp-like pains in her epigastrium.  She denies further episodes of firmness or redness over the hernia.  He she reports that she \"has lost some weight\" but then reports that she \"had a birthday and went a little crazy and gained it back.\"  Her weight today is 339 " "pounds compared to 344 pounds one month ago.        The following portions of the patient's history were reviewed and updated as appropriate: allergies, current medications, past family history, past medical history, past social history, past surgical history and problem list.    Review of Systems   Constitutional: Negative for chills, fatigue and fever.   Respiratory: Negative for cough.    Cardiovascular: Negative for chest pain.   Gastrointestinal: Positive for abdominal pain, constipation and diarrhea. Negative for nausea and vomiting.       Objective    /82  Pulse 85  Temp 98.6 °F (37 °C) (Temporal Artery )   Ht 62.5\" (158.8 cm)  Wt (!) 339 lb (154 kg)  SpO2 99%  BMI 61.02 kg/m2    Physical Exam   Constitutional: She is oriented to person, place, and time. She appears well-developed and well-nourished.   HENT:   Head: Normocephalic and atraumatic.   Cardiovascular: Regular rhythm.    Pulmonary/Chest: Effort normal.   Abdominal: Soft. She exhibits no distension. There is no tenderness.   STABLE INCISIONAL HERNIA IN THE RUQ.  REDUCIBLE.     Neurological: She is alert and oriented to person, place, and time.   Skin: Skin is warm and dry.   Psychiatric: She has a normal mood and affect. Her behavior is normal.       Assessment/Plan   Lynda was seen today for follow-up.    Diagnoses and all orders for this visit:    Morbid obesity with BMI of 60.0-69.9, adult  -     Ambulatory Referral to Bariatric Surgery    Incisional hernia, without obstruction or gangrene    Once again, I had a long discussion with Lynda regarding the need for weight loss.  She has multiple medical comorbidities that are related to her morbid obesity and are likely to improve with weight reduction.  She also has a difficult ariway, which is likely to improve with weight loss as well.  She is not a candidate for hernia repair at this time due to her BMI of 61.  Any hernia repair performed at BMI > 35-40 has a recurrence of 100%.  " Fortunately, she is no longer having episodes concerning for incarceration.  Her hernia today is reducible.  We again discussed the role of a bariatric surgery evaluation.  She is agreeable.  I have placed a referral to Dr. Alana Vasquez for bariatric evaluation.  I have advised the patient that I will be happy to repair her incisional hernia if she is able to lose weight to bring her BMI down to an acceptable range.  We discussed signs and symptoms of concern that would necessitate emergency evaluation.  She verbalized understanding.  I will see her back as needed.

## 2017-11-08 ENCOUNTER — HOSPITAL ENCOUNTER (OUTPATIENT)
Dept: NUTRITION | Facility: HOSPITAL | Age: 32
Discharge: HOME | End: 2017-11-08
Admitting: SURGERY

## 2017-11-08 VITALS — HEIGHT: 63 IN | BODY MASS INDEX: 51.91 KG/M2 | WEIGHT: 293 LBS

## 2017-11-08 PROCEDURE — 97802 MEDICAL NUTRITION INDIV IN: CPT

## 2017-11-08 NOTE — CONSULTS
Adult Outpatient Nutrition  Assessment/PES    Patient Name:  Lynda Biswas  YOB: 1985  MRN: [id number]    Assessment Date:  11/8/2017    Comments:  Pt was seen today for weight loss. Pt's current weight is 344.6 lbs. Pt works at [employer] and has started working more since so many employees have quit. Pt is under stress and finds comfort in eating. Pt did disclose that she has never been diagnosed, but stated she binge eats and has done so her entire life. She lives alone, but parents live underneath her. She did state that her depression affects her eating habits and often causes her to overeat. She is considering bariatric surgery but as a last resort. Pt did become emotional during visit but reassured her we could go as fast or as slow as she would like in making her goals. Pt may benefit from a consult for mental/emotional support.     Reviewed AND's 1700 kcal meal plan and allotted portion sizes for each food group. We also discussed how to meal prep and started working on a schedule pt could use to plan out her weeks, which will hopefully give her a sense of control and routine to her busy lifestyle.     Pt made 1 goal during visit today: Set schedule for meal prep and exercise starting on Sundays for the entire week. If pt needs to use Wednesday or Thursday to help reload or reset, she can do so. Pt was somewhat confident in completing this goal but said if something is to come up, she is usually breaks down and stops her dieting. Pt said starting a routine would help keep her on track when something does come up. Pt scheduled a follow-up visit for two weeks on November 27th at 10:30. RD to follow pt. Consult RD PRN.            General Info       11/08/17 1118    Today's Session    Person(s) attending today's session Patient     Services Used Today? No    General Information    How well do you speak English? very well    Do you speak a language other than English at home? no     Are you able to read and write English? Yes    Lives With alone    Is patient pregnant? no            Physical Findings       11/08/17 1118    Physical Findings/Assessment    Additional Documentation Physical Appearance (Group)    Physical Appearance    Overall Physical Appearance obese              Nutritional Info/Activity       11/08/17 1120    Nutritional Information    Have you had weight changes? Yes    Describe weight changes Lost ~14 lbs a few weeks ago, but gained some back    What is your desired body weight? 86.2 kg (190 lb)    Have you tried to lose weight before? Yes    List programs tried, date, and success Used the book [employer] and followed recipes. Lost ~14 lbs    What is your motivation to lose weight? My health    Food Allergies/Intolerances milk/dairy    History of eating disorder? Other (comment)   Binge eating    What cultural diet influences are important for you to follow? N/A    Do you have difficulty chewing food? No    Who Prepares Meals self    List any food cravings/trigger foods you have Cheesitz, ice cream, peanut butter    List any food aversions Cooked peas, whipped cream    How often during the day do you find yourself snacking? 3-4x    Food Behaviors Stress eater;Boredom eater    How often do you eat out and where? [employer] 2x/week- Quarter pounder or nuggets, small salad and small drink; [employer] 4 for 4 option, [employer] $5 box    Do you use Food Assistance programs (WIC, food stamps, food bank)? no    Do you need information about Food Assistance programs? yes    How many times do you drink milk per day? 0    How many times do you eat fruit per day? 1    How many times do you eat vegetables per day? 1    How many times do you drink juice per day? 0    How many times do you eat candy/chocolates per day? 2    How many times do you eat baked goods per day? 1    How many times do you eat desserts per day? 1    How many times do you eat ice cream per day? 0    How many times  "do you eat snack foods per day? 0    How many diet sodas do you drink per day? 2   Large    How many regular sodas do you drink per day? 0    How many times do you eat ethnic food per day? 0    How many times do you drink alcohol per day? 0    How many times do you have caffeine per day? 3    How many servings of artificial sweetner do you have per day? 3    How many meals do you eat each day? 2    How many snacks do you eat each day? 3    What is the biggest challenge you have with your diet? Other (comment);Financial burden of food   Depression issues, using food as safety net    What type of support do you currently use to help you with your health issues? Home-mom or friend at work on occassion    Enter everything you can remember eating in the last 24 hours (1 day) Granola and nut clusters (2 bags), Quarter Pounder, McChicken, Gatorade (32 oz), 1 Lg sweet tea, 2 small coffee, 2 lg (32 oz) soda, 2 chicken egg rolls, 1 cup fried rice, 1 cheesburger with BBQ sauce, 5 hour energy shot    Eating Environment    Eating environment Alone;Work;Family    Physical Activity    Are you currently involved in an activity/exercise program?  No    Reasons for Inactivity Limited activity    How many minutes do you spend on exercise each day? 0   was walking 15 minutes with mom    How would you rank exercise as an important health lifestyle practice? 7              Estimated/Assessed Needs       11/08/17 1138    Calculation Measurements    Weight Used For Calculations 52 kg (114 lb 9.5 oz)   IBW    Height Used for Calculations 1.588 m (5' 2.5\")    Estimated/Assessed Energy Needs    Energy Need Method [facility]    Age 32    [facility] 1185.99    Activity Factors ([facility])  --   1.4    Estimated Kcal Range  ~4150-7714    Estimated/Assessed Protein Needs    Weight Used for Protein Calculation 52 kg (114 lb 9.5 oz)    Estimated Protein Range 42-52 gm    Estimated/Assessed Fluid Needs    Fluid Need " Method RDA method    RDA Method (mL)  1950              Problem/Interventions:        Problem 1       11/08/17 1142    Nutrition Diagnoses Problem 1    Problem 1 Overweight/Obesity    Etiology (related to) Factors Affecting Nutrition    Food Habit/Preferences Large Meals    Signs/Symptoms (evidenced by) BMI    BMI Greater than 40                    Intervention Goal       11/08/17 1143    Intervention Goal    General Meet nutritional needs for age/condition;Provide information regarding MNT for treatment/condition    PO Meet estimated needs    Weight Appropriate weight loss              Electronically signed by:  Kathryn Beltre RD  11/08/17 11:45 AM

## 2017-11-14 ENCOUNTER — DOCUMENTATION (OUTPATIENT)
Dept: DIABETES SERVICES | Facility: HOSPITAL | Age: 32
End: 2017-11-14

## 2017-11-14 NOTE — PROGRESS NOTES
CLINICAL NUTRITION INITIAL OFFICE VISIT NOTES FROM 11/08/2017 WERE ROUTED TO THE REFERRING PROVIDER ON 11/14/2017 FOR REVIEW

## 2017-12-01 NOTE — PROGRESS NOTES
Patient: Lynda Biswas    YOB: 1985    Date: 09/28/2017    Primary Care Provider: NICHOLAS Oliver    Reason for Consultation: Reschedule incisional hernia    Chief complaint:   Chief Complaint   Patient presents with   • Follow-up     Patient is here to reschedule hernia surgery       Subjective .     History of present illness:  Lynda returns to the office today to discuss her recent aborted incisional hernia repair.  The procedure was planned and during the induction of anesthesia, the patient's airway proved to be even more difficult than during her prior cholecystectomy, and she was unable to be intubated.  She was allowed to recover from her anesthesia and the procedure was canceled.  This was explained in detail to the patient and her parents on the day of the procedure.  Today she returns to discuss this.  Her symptoms are largely unchanged related to the hernia.  She continues to complain of pain after long hours of standing.  She also has a right upper quadrant bulge.  However, she denies further episodes of the area becoming red and warm.          Review of Systems   Constitutional: Negative for chills, fatigue and fever.   HENT: Negative for trouble swallowing.    Respiratory: Negative for cough and shortness of breath.    Cardiovascular: Negative for chest pain.   Gastrointestinal: Positive for abdominal pain and nausea. Negative for diarrhea and vomiting.       History:  Past Medical History:   Diagnosis Date   • Abdominal pain    • Anxiety    • Arthritis    • Bladder spasms    • Body piercing     EARS AND LEFT NIPPLE   • Depression    • Diabetes     TAKES ORAL MED, NO INSULIN   • Fatty liver    • Gall stones    • GERD (gastroesophageal reflux disease)    • Hard of hearing     RIGHT SIDE   • Hard to intubate     REPORTS DISCOVERED WITH LAP CHOLEY IN 2017, THAT SHE ENDED UP BEING AN OPEN CHOLEY AND WAS IN ICU AFTER PROCEDURE.   • Hearing loss     PATIENT REPORTS VERY MILD AND  THAT SHE DOES NOT USE HEARING DEVICES   • Hypertension    • MRSA (methicillin resistant Staphylococcus aureus)     REPORTS 2-3 YEARS AGO IN LEFT BREAST AND THAT SHE WAS TREATED.   • Nausea     AFTER EATING   • PEDRO on CPAP     INSTRUCTED TO BRING IN DOS   • PCOS (polycystic ovarian syndrome)    • Psoriasis    • Tattoo     X1   • Wears glasses        Past Surgical History:   Procedure Laterality Date   • APPENDECTOMY     • OTHER SURGICAL HISTORY      INGROWN TOE NAIL REMOVED   • RI LAP,CHOLECYSTECTOMY N/A 4/17/2017    Procedure: CHOLECYSTECTOMY LAPAROSCOPIC CONVERTED TO OPEN WITH INTRAOPERATIVE CHOLEANGIOGRAM;  Surgeon: Keysha Keenan MD;  Location: [facility];  Service: General       Family History   Problem Relation Age of Onset   • Arthritis Other    • Cancer Other    • Diabetes Other    • Hypertension Other    • Kidney disease Other    • Obesity Other    • Hypertension Mother    • Kidney disease Maternal Aunt        Social History   Substance Use Topics   • Smoking status: Current Some Day Smoker     Packs/day: 0.25     Years: 10.00     Types: Cigarettes   • Smokeless tobacco: Never Used      Comment: REPORTS SMOKES VERY LITTLE   • Alcohol use Yes      Comment: SOCIAL USE, NO ABUSE REPORTED       Allergies:  No Known Allergies    Medications:    Current Outpatient Prescriptions:   •  Blood Glucose Monitoring Suppl (FREESTYLE LITE) device, , Disp: , Rfl:   •  busPIRone (BUSPAR) 5 MG tablet, Take 5 mg by mouth 3 (Three) Times a Day., Disp: , Rfl:   •  cyclobenzaprine (FLEXERIL) 10 MG tablet, Take 10 mg by mouth 2 (Two) Times a Day As Needed for Muscle Spasms., Disp: , Rfl:   •  FREESTYLE LITE test strip, , Disp: , Rfl:   •  furosemide (LASIX) 20 MG tablet, Take 20 mg by mouth 2 (Two) Times a Day., Disp: , Rfl:   •  gabapentin (NEURONTIN) 300 MG capsule, Take 300 mg by mouth 3 (Three) Times a Day., Disp: , Rfl:   •  Lancets (FREESTYLE) lancets, , Disp: , Rfl:   •  lisinopril-hydrochlorothiazide  (PRINZIDE,ZESTORETIC) 20-12.5 MG per tablet, Take 1 tablet by mouth Daily., Disp: , Rfl:   •  metFORMIN ER (GLUCOPHAGE-XR) 500 MG 24 hr tablet, Take 500 mg by mouth 2 (Two) Times a Day With Meals., Disp: , Rfl:   •  norgestimate-ethinyl estradiol (SPRINTEC 28) 0.25-35 MG-MCG per tablet, Take 1 tablet by mouth Daily., Disp: , Rfl:   •  sertraline (ZOLOFT) 50 MG tablet, Take 50 mg by mouth Daily., Disp: , Rfl:   •  triamcinolone (KENALOG) 0.1 % cream, Apply 1 application topically Daily As Needed for Irritation., Disp: , Rfl:     Objective     Vital Signs:       Vitals:    09/28/17 0909   BP: 150/90   Pulse: 81   Temp: 98.4 °F (36.9 °C)   SpO2: 99%         Physical Exam:   General Appearance:    Alert, cooperative, in no acute distress   Head:    Normocephalic, without obvious abnormality, atraumatic   Eyes:            Lids and lashes normal, conjunctivae and sclerae normal, no   icterus, no pallor, corneas clear, PERRLA   Ears:    Ears appear intact with no abnormalities noted   Throat:   No oral lesions, no thrush, oral mucosa moist   Neck:   No adenopathy, supple, trachea midline, no thyromegaly, no   carotid bruit, no JVD   Lungs:     Clear to auscultation,respirations regular, even and                  unlabored    Heart:    Regular rhythm and normal rate, normal S1 and S2, no            murmur   Abdomen:     no masses, no organomegaly, soft non-tender, non-distended, no guarding, there is evidence of a Reducible incisional hernia with at least a 5 cm defect.     Extremities:   Moves all extremities well, no edema, no cyanosis, no             redness   Pulses:   Pulses palpable and equal bilaterally   Skin:   No bleeding, bruising or rash   Lymph nodes:   No palpable adenopathy   Neurologic:   Cranial nerves 2 - 12 grossly intact, sensation intact        Results Review:   I reviewed the patient's new clinical results.    Review of Systems was reviewed and confirmed as accurate today.    Assessment/Plan :    1.  Morbid obesity with BMI of 60.0-69.9, adult    2. Incisional hernia, without obstruction or gangrene      I had a long and detailed discussion with Lynda today in the office regarding the recent events around her clinic planned hernia repair.  I have advised her that I do not think it is wise to proceed with an attempt at repair until she loses weight.  She and I had discussed this previously, and her attempt at repair was scheduled due to the fact that she was having episodes that seemed to represent acute incarceration with impending strangulation related to redness and warmth overlying the area of the hernia with exquisite pain.  However between her morbid obesity and her airway, it is far too risky to proceed at this facility.  I have counseled her that most surgeons to do large numbers of hernia repairs every year would agree that she needs to get her BMI below 40 to have a good outcome.  The literature supports this as well.  Her airway though is my greater concern.  This will also likely be improved with weight loss.  She is willing to see the nutritionist but declines a referral to bariatric surgery.  I will see her back after she has seen the nutritionist.     I discussed the patients findings and my recommendations with patient.       Scribed for Keysha Keenan MD by Maria Fernanda Blanton.

## 2017-12-29 ENCOUNTER — OFFICE VISIT (OUTPATIENT)
Dept: RETAIL CLINIC | Facility: CLINIC | Age: 32
End: 2017-12-29

## 2017-12-29 VITALS
TEMPERATURE: 99.2 F | HEART RATE: 95 BPM | WEIGHT: 293 LBS | BODY MASS INDEX: 51.91 KG/M2 | HEIGHT: 63 IN | RESPIRATION RATE: 16 BRPM | OXYGEN SATURATION: 98 %

## 2017-12-29 DIAGNOSIS — H65.03 BILATERAL ACUTE SEROUS OTITIS MEDIA, RECURRENCE NOT SPECIFIED: ICD-10-CM

## 2017-12-29 DIAGNOSIS — J40 BRONCHITIS: Primary | ICD-10-CM

## 2017-12-29 PROCEDURE — 99213 OFFICE O/P EST LOW 20 MIN: CPT | Performed by: NURSE PRACTITIONER

## 2017-12-29 RX ORDER — AMOXICILLIN AND CLAVULANATE POTASSIUM 875; 125 MG/1; MG/1
1 TABLET, FILM COATED ORAL EVERY 12 HOURS SCHEDULED
Qty: 20 TABLET | Refills: 0 | Status: SHIPPED | OUTPATIENT
Start: 2017-12-29 | End: 2018-01-08

## 2017-12-29 RX ORDER — DEXTROMETHORPHAN HYDROBROMIDE AND PROMETHAZINE HYDROCHLORIDE 15; 6.25 MG/5ML; MG/5ML
5 SYRUP ORAL 4 TIMES DAILY PRN
Qty: 100 ML | Refills: 0 | Status: SHIPPED | OUTPATIENT
Start: 2017-12-29 | End: 2018-01-03

## 2017-12-29 RX ORDER — ALBUTEROL SULFATE 90 UG/1
2 AEROSOL, METERED RESPIRATORY (INHALATION) EVERY 4 HOURS PRN
Qty: 1 INHALER | Refills: 0 | Status: SHIPPED | OUTPATIENT
Start: 2017-12-29 | End: 2018-01-08

## 2017-12-29 RX ORDER — LORATADINE 10 MG/1
10 TABLET ORAL DAILY
Qty: 30 TABLET | Refills: 0 | Status: SHIPPED | OUTPATIENT
Start: 2017-12-29 | End: 2018-01-28

## 2017-12-29 RX ORDER — PREDNISONE 20 MG/1
20 TABLET ORAL 3 TIMES DAILY
Qty: 9 TABLET | Refills: 0 | Status: SHIPPED | OUTPATIENT
Start: 2017-12-29 | End: 2018-01-01

## 2017-12-29 RX ORDER — FLUTICASONE PROPIONATE 50 MCG
2 SPRAY, SUSPENSION (ML) NASAL DAILY PRN
Qty: 1 BOTTLE | Refills: 0 | Status: SHIPPED | OUTPATIENT
Start: 2017-12-29 | End: 2018-01-28

## 2017-12-29 RX ORDER — BENZONATATE 100 MG/1
100 CAPSULE ORAL 3 TIMES DAILY PRN
Qty: 15 CAPSULE | Refills: 0 | Status: SHIPPED | OUTPATIENT
Start: 2017-12-29 | End: 2018-01-08

## 2017-12-29 NOTE — PROGRESS NOTES
Subjective   Lynda Biswas is a 32 y.o. female.     HPI Comments: Evaluated at ER two weeks ago for bronchitis and completed zpak, steroids, and inhaler. Symptoms improved but returned a few days after completing medication.    Cough   This is a recurrent problem. Episode onset: 2 weeks ago. The problem has been gradually worsening. The problem occurs constantly. The cough is productive of sputum. Associated symptoms include chills, ear congestion, ear pain, nasal congestion, rhinorrhea, a sore throat (due to cough) and wheezing. Pertinent negatives include no chest pain, fever, headaches, hemoptysis, myalgias, rash, shortness of breath or sweats. The symptoms are aggravated by lying down. Treatments tried: zpak, steroids. The treatment provided moderate relief.        Current Outpatient Prescriptions on File Prior to Visit   Medication Sig Dispense Refill   • Blood Glucose Monitoring Suppl (FREESTYLE LITE) device      • busPIRone (BUSPAR) 5 MG tablet Take 5 mg by mouth 3 (Three) Times a Day.     • cyclobenzaprine (FLEXERIL) 10 MG tablet Take 10 mg by mouth 2 (Two) Times a Day As Needed for Muscle Spasms.     • FREESTYLE LITE test strip      • furosemide (LASIX) 20 MG tablet Take 20 mg by mouth 2 (Two) Times a Day.     • gabapentin (NEURONTIN) 300 MG capsule Take 300 mg by mouth 3 (Three) Times a Day.     • Lancets (FREESTYLE) lancets      • lisinopril-hydrochlorothiazide (PRINZIDE,ZESTORETIC) 20-12.5 MG per tablet Take 1 tablet by mouth Daily.     • metFORMIN ER (GLUCOPHAGE-XR) 500 MG 24 hr tablet Take 500 mg by mouth 2 (Two) Times a Day With Meals.     • norgestimate-ethinyl estradiol (SPRINTEC 28) 0.25-35 MG-MCG per tablet Take 1 tablet by mouth Daily.     • sertraline (ZOLOFT) 50 MG tablet Take 50 mg by mouth Daily.     • triamcinolone (KENALOG) 0.1 % cream Apply 1 application topically Daily As Needed for Irritation.       No current facility-administered medications on file prior to visit.        No  Known Allergies    Past Medical History:   Diagnosis Date   • Abdominal pain    • Anxiety    • Arthritis    • Bladder spasms    • Body piercing     EARS AND LEFT NIPPLE   • Depression    • Diabetes     TAKES ORAL MED, NO INSULIN   • Fatty liver    • Gall stones    • GERD (gastroesophageal reflux disease)    • Hard of hearing     RIGHT SIDE   • Hard to intubate     REPORTS DISCOVERED WITH LAP CHOLEY IN 2017, THAT SHE ENDED UP BEING AN OPEN CHOLEY AND WAS IN ICU AFTER PROCEDURE.   • Hearing loss     PATIENT REPORTS VERY MILD AND THAT SHE DOES NOT USE HEARING DEVICES   • Hypertension    • MRSA (methicillin resistant Staphylococcus aureus)     REPORTS 2-3 YEARS AGO IN LEFT BREAST AND THAT SHE WAS TREATED.   • Nausea     AFTER EATING   • PEDRO on CPAP     INSTRUCTED TO BRING IN DOS   • PCOS (polycystic ovarian syndrome)    • Psoriasis    • Tattoo     X1   • Wears glasses        Past Surgical History:   Procedure Laterality Date   • APPENDECTOMY     • OTHER SURGICAL HISTORY      INGROWN TOE NAIL REMOVED   • MA LAP,CHOLECYSTECTOMY N/A 4/17/2017    Procedure: CHOLECYSTECTOMY LAPAROSCOPIC CONVERTED TO OPEN WITH INTRAOPERATIVE CHOLEANGIOGRAM;  Surgeon: Keysha Keenan MD;  Location: [facility];  Service: General       Family History   Problem Relation Age of Onset   • Arthritis Other    • Cancer Other    • Diabetes Other    • Hypertension Other    • Kidney disease Other    • Obesity Other    • Hypertension Mother    • Kidney disease Maternal Aunt        Social History     Social History   • Marital status: Single     Spouse name: N/A   • Number of children: N/A   • Years of education: N/A     Occupational History   • Not on file.     Social History Main Topics   • Smoking status: Current Some Day Smoker     Packs/day: 0.25     Years: 10.00     Types: Cigarettes   • Smokeless tobacco: Never Used      Comment: REPORTS SMOKES VERY LITTLE   • Alcohol use Yes      Comment: SOCIAL USE, NO ABUSE REPORTED   • Drug use: No  "  • Sexual activity: Defer     Other Topics Concern   • Not on file     Social History Narrative       Review of Systems   Constitutional: Positive for activity change and chills. Negative for diaphoresis and fever.   HENT: Positive for congestion, ear pain, rhinorrhea, sinus pressure and sore throat (due to cough).    Respiratory: Positive for cough and wheezing. Negative for hemoptysis and shortness of breath.    Cardiovascular: Negative for chest pain.   Gastrointestinal: Negative for abdominal pain, diarrhea, nausea and vomiting.   Musculoskeletal: Negative for myalgias.   Skin: Negative for rash.   Neurological: Negative for headaches.       Pulse 95  Temp 99.2 °F (37.3 °C)  Resp 16  Ht 158.8 cm (62.5\")  Wt (!) 158 kg (349 lb)  LMP 12/29/2017  SpO2 98%  Breastfeeding? No  BMI 62.82 kg/m2    Objective   Physical Exam   Constitutional: She is oriented to person, place, and time. She appears well-developed and well-nourished. She is cooperative.   HENT:   Head: Normocephalic.   Right Ear: External ear and ear canal normal. Tympanic membrane is erythematous. A middle ear effusion is present.   Left Ear: External ear and ear canal normal. Tympanic membrane is erythematous. A middle ear effusion is present.   Nose: Right sinus exhibits no maxillary sinus tenderness and no frontal sinus tenderness. Left sinus exhibits no maxillary sinus tenderness and no frontal sinus tenderness.   Mouth/Throat: Uvula is midline, oropharynx is clear and moist and mucous membranes are normal. No tonsillar exudate.   Eyes: Conjunctivae, EOM and lids are normal.   Cardiovascular: Normal rate, regular rhythm and normal heart sounds.    Pulmonary/Chest: Effort normal. No accessory muscle usage. No respiratory distress. She has decreased breath sounds. She has wheezes. She has rhonchi.   Lymphadenopathy:     She has no cervical adenopathy.   Neurological: She is alert and oriented to person, place, and time.   Skin: Skin is warm, " dry and intact.   Psychiatric: She has a normal mood and affect. Her speech is normal and behavior is normal.         Assessment/Plan   Lynda was seen today for cough.    Diagnoses and all orders for this visit:    Bronchitis  -     amoxicillin-clavulanate (AUGMENTIN) 875-125 MG per tablet; Take 1 tablet by mouth Every 12 (Twelve) Hours for 10 days.  -     predniSONE (DELTASONE) 20 MG tablet; Take 1 tablet by mouth 3 (Three) Times a Day for 3 days.  -     benzonatate (TESSALON PERLES) 100 MG capsule; Take 1 capsule by mouth 3 (Three) Times a Day As Needed for Cough for up to 10 days.  -     promethazine-dextromethorphan (PROMETHAZINE-DM) 6.25-15 MG/5ML syrup; Take 5 mL by mouth 4 (Four) Times a Day As Needed for Cough for up to 5 days.  -     albuterol (PROVENTIL HFA;VENTOLIN HFA) 108 (90 Base) MCG/ACT inhaler; Inhale 2 puffs Every 4 (Four) Hours As Needed for Wheezing for up to 10 days.    Bilateral acute serous otitis media, recurrence not specified  -     amoxicillin-clavulanate (AUGMENTIN) 875-125 MG per tablet; Take 1 tablet by mouth Every 12 (Twelve) Hours for 10 days.  -     predniSONE (DELTASONE) 20 MG tablet; Take 1 tablet by mouth 3 (Three) Times a Day for 3 days.  -     loratadine (CLARITIN) 10 MG tablet; Take 1 tablet by mouth Daily for 30 days.  -     fluticasone (FLONASE) 50 MCG/ACT nasal spray; 2 sprays into each nostril Daily As Needed for Rhinitis for up to 30 days.        Follow up with PCP or go to the nearest emergency room if symptoms worsen or fail to improve.

## 2017-12-29 NOTE — PATIENT INSTRUCTIONS
"Otitis Media With Effusion  Otitis media with effusion is the presence of fluid in the middle ear. This is a common problem in children, which often follows ear infections. It may be present for weeks or longer after the infection. Unlike an acute ear infection, otitis media with effusion refers only to fluid behind the ear drum and not infection. Children with repeated ear and sinus infections and allergy problems are the most likely to get otitis media with effusion.  CAUSES   The most frequent cause of the fluid buildup is dysfunction of the eustachian tubes. These are the tubes that drain fluid in the ears to the back of the nose (nasopharynx).  SYMPTOMS   · The main symptom of this condition is hearing loss. As a result, you or your child may:    Listen to the TV at a loud volume.    Not respond to questions.    Ask \"what\" often when spoken to.    Mistake or confuse one sound or word for another.  · There may be a sensation of fullness or pressure but usually not pain.  DIAGNOSIS   · Your health care provider will diagnose this condition by examining you or your child's ears.  · Your health care provider may test the pressure in you or your child's ear with a tympanometer.  · A hearing test may be conducted if the problem persists.  TREATMENT   · Treatment depends on the duration and the effects of the effusion.  · Antibiotics, decongestants, nose drops, and cortisone-type drugs (tablets or nasal spray) may not be helpful.  · Children with persistent ear effusions may have delayed language or behavioral problems. Children at risk for developmental delays in hearing, learning, and speech may require referral to a specialist earlier than children not at risk.  · You or your child's health care provider may suggest a referral to an ear, nose, and throat surgeon for treatment. The following may help restore normal hearing:    Drainage of fluid.    Placement of ear tubes (tympanostomy tubes).    Removal of adenoids " (adenoidectomy).  HOME CARE INSTRUCTIONS   · Avoid secondhand smoke.  · Infants who are  are less likely to have this condition.  · Avoid feeding infants while they are lying flat.  · Avoid known environmental allergens.  · Avoid people who are sick.  SEEK MEDICAL CARE IF:   · Hearing is not better in 3 months.  · Hearing is worse.  · Ear pain.  · Drainage from the ear.  · Dizziness.  MAKE SURE YOU:   · Understand these instructions.  · Will watch your condition.  · Will get help right away if you are not doing well or get worse.     This information is not intended to replace advice given to you by your health care provider. Make sure you discuss any questions you have with your health care provider.     Document Released: 01/25/2006 Document Revised: 01/08/2016 Document Reviewed: 07/15/2014  [employer] Interactive Patient Education ©2017 [employer]  Acute Bronchitis  Bronchitis is inflammation of the airways that extend from the windpipe into the lungs (bronchi). The inflammation often causes mucus to develop. This leads to a cough, which is the most common symptom of bronchitis.   In acute bronchitis, the condition usually develops suddenly and goes away over time, usually in a couple weeks. Smoking, allergies, and asthma can make bronchitis worse. Repeated episodes of bronchitis may cause further lung problems.   CAUSES  Acute bronchitis is most often caused by the same virus that causes a cold. The virus can spread from person to person (contagious) through coughing, sneezing, and touching contaminated objects.  SIGNS AND SYMPTOMS   · Cough.    · Fever.    · Coughing up mucus.    · Body aches.    · Chest congestion.    · Chills.    · Shortness of breath.    · Sore throat.    DIAGNOSIS   Acute bronchitis is usually diagnosed through a physical exam. Your health care provider will also ask you questions about your medical history. Tests, such as chest X-rays, are sometimes done to rule out other  conditions.   TREATMENT   Acute bronchitis usually goes away in a couple weeks. Oftentimes, no medical treatment is necessary. Medicines are sometimes given for relief of fever or cough. Antibiotic medicines are usually not needed but may be prescribed in certain situations. In some cases, an inhaler may be recommended to help reduce shortness of breath and control the cough. A cool mist vaporizer may also be used to help thin bronchial secretions and make it easier to clear the chest.   HOME CARE INSTRUCTIONS  · Get plenty of rest.    · Drink enough fluids to keep your urine clear or pale yellow (unless you have a medical condition that requires fluid restriction). Increasing fluids may help thin your respiratory secretions (sputum) and reduce chest congestion, and it will prevent dehydration.    · Take medicines only as directed by your health care provider.  · If you were prescribed an antibiotic medicine, finish it all even if you start to feel better.  · Avoid smoking and secondhand smoke. Exposure to cigarette smoke or irritating chemicals will make bronchitis worse. If you are a smoker, consider using nicotine gum or skin patches to help control withdrawal symptoms. Quitting smoking will help your lungs heal faster.    · Reduce the chances of another bout of acute bronchitis by washing your hands frequently, avoiding people with cold symptoms, and trying not to touch your hands to your mouth, nose, or eyes.    · Keep all follow-up visits as directed by your health care provider.    SEEK MEDICAL CARE IF:  Your symptoms do not improve after 1 week of treatment.   SEEK IMMEDIATE MEDICAL CARE IF:  · You develop an increased fever or chills.    · You have chest pain.    · You have severe shortness of breath.  · You have bloody sputum.    · You develop dehydration.  · You faint or repeatedly feel like you are going to pass out.  · You develop repeated vomiting.  · You develop a severe headache.  MAKE SURE YOU:    · Understand these instructions.  · Will watch your condition.  · Will get help right away if you are not doing well or get worse.     This information is not intended to replace advice given to you by your health care provider. Make sure you discuss any questions you have with your health care provider.     Document Released: 01/25/2006 Document Revised: 01/08/2016 Document Reviewed: 06/10/2014  [employer] Interactive Patient Education ©2017 [employer]

## 2018-01-17 ENCOUNTER — DOCUMENTATION (OUTPATIENT)
Dept: NUTRITION | Facility: HOSPITAL | Age: 33
End: 2018-01-17

## 2018-01-17 NOTE — PROGRESS NOTES
Nutrition Services    Patient Name:  Lynda Biswas  YOB: 1985  MRN: [id number]  Admit Date:  (Not on file)    Pt was mailed follow-up letter requesting a survey be completed and mailed back. RD has not received any letters of phone calls and it has been over 2 weeks. Pt's chart will be closed at this time.     Electronically signed by:  Kathryn Beltre RD  01/17/18 9:14 AM

## 2018-01-17 NOTE — PROGRESS NOTES
Nutrition Services    Patient Name:  Lynda Biswas  YOB: 1985  MRN: [id number]  Admit Date:  (Not on file)    Pt was mailed follow-up letter and was requested to complete questionnaire and return. RD has not heard or received follow-up survey from pt in over 2 weeks. Pt's chart will be closed at this time.     Electronically signed by:  Kathryn Beltre RD  01/17/18 4:21 PM

## 2018-01-18 ENCOUNTER — DOCUMENTATION (OUTPATIENT)
Dept: DIABETES SERVICES | Facility: HOSPITAL | Age: 33
End: 2018-01-18

## 2018-01-18 NOTE — PROGRESS NOTES
CLINICAL NUTRITION NOTE WAS ROUTED TO THE REFERRING PROVIDER INFORMING THEM THAT A FOLLOW UP LETTER WAS MAILED TO THE PATIENT BUT THERE HAS BEEN NO RESPONSE FROM HER.

## 2018-02-23 ENCOUNTER — TELEPHONE (OUTPATIENT)
Dept: SURGERY | Facility: CLINIC | Age: 33
End: 2018-02-23

## 2018-06-01 DIAGNOSIS — M25.572 LEFT ANKLE PAIN, UNSPECIFIED CHRONICITY: ICD-10-CM

## 2018-06-01 DIAGNOSIS — M79.671 RIGHT FOOT PAIN: Primary | ICD-10-CM

## 2018-06-04 ENCOUNTER — HOSPITAL ENCOUNTER (OUTPATIENT)
Dept: GENERAL RADIOLOGY | Facility: HOSPITAL | Age: 33
Discharge: HOME | End: 2018-06-04
Attending: PODIATRIST | Admitting: PODIATRIST

## 2018-06-04 ENCOUNTER — OFFICE VISIT (OUTPATIENT)
Dept: ORTHOPEDIC SURGERY | Facility: CLINIC | Age: 33
End: 2018-06-04

## 2018-06-04 ENCOUNTER — HOSPITAL ENCOUNTER (OUTPATIENT)
Dept: GENERAL RADIOLOGY | Facility: HOSPITAL | Age: 33
Discharge: HOME | End: 2018-06-04
Attending: PODIATRIST

## 2018-06-04 ENCOUNTER — TRANSCRIBE ORDERS (OUTPATIENT)
Dept: GENERAL RADIOLOGY | Facility: HOSPITAL | Age: 33
End: 2018-06-04

## 2018-06-04 VITALS — WEIGHT: 293 LBS | BODY MASS INDEX: 51.91 KG/M2 | RESPIRATION RATE: 18 BRPM | HEIGHT: 63 IN

## 2018-06-04 DIAGNOSIS — R52 PAIN: ICD-10-CM

## 2018-06-04 DIAGNOSIS — M72.2 PLANTAR FASCIITIS: Primary | ICD-10-CM

## 2018-06-04 DIAGNOSIS — R52 PAIN: Primary | ICD-10-CM

## 2018-06-04 DIAGNOSIS — R60.0 EDEMA OF EXTREMITY OF UNKNOWN CAUSE: ICD-10-CM

## 2018-06-04 DIAGNOSIS — L60.0 INGROWN NAIL: ICD-10-CM

## 2018-06-04 PROCEDURE — 99204 OFFICE O/P NEW MOD 45 MIN: CPT | Performed by: PODIATRIST

## 2018-06-04 PROCEDURE — 73610 X-RAY EXAM OF ANKLE: CPT

## 2018-06-04 PROCEDURE — 73630 X-RAY EXAM OF FOOT: CPT

## 2018-06-04 RX ORDER — MELOXICAM 7.5 MG/1
7.5 TABLET ORAL DAILY
Qty: 30 TABLET | Refills: 0 | Status: SHIPPED | OUTPATIENT
Start: 2018-06-04 | End: 2020-06-16

## 2018-06-04 RX ORDER — OXYBUTYNIN CHLORIDE 5 MG/1
5 TABLET ORAL 3 TIMES DAILY
COMMUNITY
End: 2020-06-16

## 2018-06-04 NOTE — PROGRESS NOTES
Subjective   Patient ID: Lynda Biswas is a 32 y.o. female   Pain of the Left Ankle and Pain of the Right Foot  Morbidly obese -American female who presents today with multiple complaints including right plantar foot pain, ingrown toenails, left ankle and leg swelling.  She states she works at [employer] and is up on her feet all the time.  She comes into the office today wearing flip-flops.  She does admit that she hates wearing shoes and states she wears slippers assistant shoes at work.  She tells me that her primary care physician did take out one of her ingrown toenails in the past and told her if it ever needed to be performed again it would need to be performed by podiatrist.  She complains of stabbing aching pain that is intermittent but does happen daily to the right heel.  She states she is on Lasix and other medications to potentially help with the swelling and she does have issues with hypertension.  She also states she is borderline diabetic.    History of Present Illness    Pain Score: 6  Pain Location: Ankle  Pain Orientation: Left     Pain Descriptors: Aching, Stabbing  Pain Frequency: Intermittent  Pain Onset: Unable to tell        Aggravating Factors: Walking, Standing        Pain Intervention(s): Home medication, Rest  Result of Injury: No  Work-Related Injury: No    Review of Systems   Constitutional: Negative for diaphoresis, fever and unexpected weight change.   HENT: Negative for dental problem and sore throat.    Eyes: Negative for visual disturbance.   Respiratory: Negative for shortness of breath.    Cardiovascular: Negative for chest pain.   Gastrointestinal: Negative for abdominal pain, constipation, diarrhea, nausea and vomiting.   Genitourinary: Negative for difficulty urinating and frequency.   Musculoskeletal: Positive for arthralgias.   Neurological: Negative for headaches.   Hematological: Does not bruise/bleed easily.   All other systems reviewed and are  negative.      Past Medical History:   Diagnosis Date   • Abdominal pain    • Anxiety    • Arthritis    • Bladder spasms    • Body piercing     EARS AND LEFT NIPPLE   • Depression    • Diabetes     TAKES ORAL MED, NO INSULIN   • Fatty liver    • Gall stones    • GERD (gastroesophageal reflux disease)    • Hard of hearing     RIGHT SIDE   • Hard to intubate     REPORTS DISCOVERED WITH LAP CHOLEY IN 2017, THAT SHE ENDED UP BEING AN OPEN CHOLEY AND WAS IN ICU AFTER PROCEDURE.   • Hearing loss     PATIENT REPORTS VERY MILD AND THAT SHE DOES NOT USE HEARING DEVICES   • Hypertension    • MRSA (methicillin resistant Staphylococcus aureus)     REPORTS 2-3 YEARS AGO IN LEFT BREAST AND THAT SHE WAS TREATED.   • Nausea     AFTER EATING   • PEDRO on CPAP     INSTRUCTED TO BRING IN DOS   • PCOS (polycystic ovarian syndrome)    • Psoriasis    • Tattoo     X1   • Wears glasses         Past Surgical History:   Procedure Laterality Date   • APPENDECTOMY     • OTHER SURGICAL HISTORY      INGROWN TOE NAIL REMOVED   • [place] LAP,CHOLECYSTECTOMY N/A 4/17/2017    Procedure: CHOLECYSTECTOMY LAPAROSCOPIC CONVERTED TO OPEN WITH INTRAOPERATIVE CHOLEANGIOGRAM;  Surgeon: Keysha Keenan MD;  Location: [facility];  Service: General       No Known Allergies      Current Outpatient Prescriptions:   •  Blood Glucose Monitoring Suppl (FREESTYLE LITE) device, , Disp: , Rfl:   •  busPIRone (BUSPAR) 5 MG tablet, Take 5 mg by mouth 3 (Three) Times a Day., Disp: , Rfl:   •  cyclobenzaprine (FLEXERIL) 10 MG tablet, Take 10 mg by mouth 2 (Two) Times a Day As Needed for Muscle Spasms., Disp: , Rfl:   •  FREESTYLE LITE test strip, , Disp: , Rfl:   •  furosemide (LASIX) 20 MG tablet, Take 20 mg by mouth 2 (Two) Times a Day., Disp: , Rfl:   •  gabapentin (NEURONTIN) 300 MG capsule, Take 300 mg by mouth 3 (Three) Times a Day., Disp: , Rfl:   •  Lancets (FREESTYLE) lancets, , Disp: , Rfl:   •  lisinopril-hydrochlorothiazide (PRINZIDE,ZESTORETIC) 20-12.5  MG per tablet, Take 1 tablet by mouth Daily., Disp: , Rfl:   •  metFORMIN ER (GLUCOPHAGE-XR) 500 MG 24 hr tablet, Take 500 mg by mouth 2 (Two) Times a Day With Meals., Disp: , Rfl:   •  norgestimate-ethinyl estradiol (SPRINTEC 28) 0.25-35 MG-MCG per tablet, Take 1 tablet by mouth Daily., Disp: , Rfl:   •  oxybutynin (DITROPAN) 5 MG tablet, Take 5 mg by mouth 3 (Three) Times a Day., Disp: , Rfl:   •  sertraline (ZOLOFT) 50 MG tablet, Take 50 mg by mouth Daily., Disp: , Rfl:   •  triamcinolone (KENALOG) 0.1 % cream, Apply 1 application topically Daily As Needed for Irritation., Disp: , Rfl:   •  meloxicam (MOBIC) 7.5 MG tablet, Take 1 tablet by mouth Daily., Disp: 30 tablet, Rfl: 0    Family History   Problem Relation Age of Onset   • Arthritis Other    • Cancer Other    • Diabetes Other    • Hypertension Other    • Kidney disease Other    • Obesity Other    • Hypertension Mother    • Kidney disease Maternal Aunt        Social History     Social History   • Marital status: Single     Spouse name: N/A   • Number of children: N/A   • Years of education: N/A     Occupational History   • Not on file.     Social History Main Topics   • Smoking status: Current Some Day Smoker     Packs/day: 0.25     Years: 10.00     Types: Cigarettes   • Smokeless tobacco: Never Used      Comment: REPORTS SMOKES VERY LITTLE   • Alcohol use Yes      Comment: SOCIAL USE, NO ABUSE REPORTED   • Drug use: No   • Sexual activity: Defer     Other Topics Concern   • Not on file     Social History Narrative   • No narrative on file       Ready to quit: No  Counseling given: Yes       I have reviewed all of the above social hx, family hx, surgical hx, medications, allergies & ROS and confirm that it is accurate.  Objective   Physical Exam   Constitutional: She is oriented to person, place, and time. She appears well-developed and well-nourished.   HENT:   Head: Normocephalic and atraumatic.   Eyes: EOM are normal. Pupils are equal, round, and  reactive to light.   Neck: Normal range of motion.   Cardiovascular: Normal rate.    Pulmonary/Chest: Effort normal.   Abdominal: Soft.   Musculoskeletal: Normal range of motion.   Neurological: She is alert and oriented to person, place, and time. She has normal reflexes.   Skin: Skin is warm.   Psychiatric: She has a normal mood and affect. Her behavior is normal. Judgment and thought content normal.   Nursing note and vitals reviewed.    Right Ankle Exam     Range of Motion   The patient has normal right ankle ROM.    Muscle Strength   The patient has normal right ankle strength.  Other   Sensation: normal  Pulse: present     Comments:  Patient is ttp at the medial tubercle of the plantar calcneus at the plantar fascial insertion, mild edema is present      Left Ankle Exam     Range of Motion   The patient has normal left ankle ROM.     Muscle Strength   The patient has normal left ankle strength.    Other   Sensation: normal  Pulse: present          Left Ankle Exam     Range of Motion   Normal left ankle ROM    Muscle Strength   Normal left ankle strength    Right Ankle Exam     Range of Motion   Normal right ankle ROM    Muscle Strength   Normal right ankle strengthComments  Patient is ttp at the medial tubercle of the plantar calcneus at the plantar fascial insertion, mild edema is present        bilateral Lower extremity exam:  Vascular: Pulses palpable, pedal hair noted, CFT brisk, no difference in edema noted to either extremity today.  Neuro: Gross sensation intact  Derm: Normal turgor and temperature, both hallux toenails medial borders are extremely incurvated and rolled into the adjacent skin folds.          Assessment/Plan plantar fasciitis, bilateral hallux incurvated nails, bl le edema  Independent Review of Radiographic Studies:    She had three-view x-rays of both feet taken at the hospital.  She's had multiple x-rays it seems taken in the past of the right foot and left ankle.  No acute fracture  injury is noted from any film.  X-rays were taken for chief complaint of pain of the right foot and swelling of the left.  No dislocation noted there is extreme increased soft tissue density noted bilaterally.  Laboratory and Other Studies:     Medical Decision Making:        Procedures  [] No procedures were performed in office today.    Lynda was seen today for pain and pain.    Diagnoses and all orders for this visit:    Plantar fasciitis    Ingrown nail    Edema of extremity of unknown cause    Other orders  -     meloxicam (MOBIC) 7.5 MG tablet; Take 1 tablet by mouth Daily.          Recommendations/Plan:  I discussed with the patient and educated them on plantar fasciitis its pathology and its treatments.  I will start with power steps and see how they do with these, if these are not helpful we will progress to custom molded orthotics.  I will start them on oral anti-inflammatories.  I will give them stretching exercises and instructed them on those.  They can perform stretches in the morning before getting out of bed with a towel,theraband, or belt.  I Also showed them weightbearing stretches to do.  They can do ice bottle massage as tolerated.  I'll see them back in 3 weeks and assess for improvement. If no better I will consider corticosteroid injection.  I also cautioned her and warned her about shoe gear and shoe choices.  In regards to her ingrown toenails I discussed options including partial nail avulsion but given that she has no pain today I would not recommend that..  If they become ingrown more problematic in the future I will be more than happy to treat these for her but I did discuss cutting her toenail straight across and monitoring these and not digging in them to potentially make these worse.  Her the edema I recommend compression socks, especially given what she does for work.  If she continues to have problems with this but is not manageable by compression and medications may also consider  vascular surgery or cardiology referral.  Follow-up with me in 3-4 weeks.  Return in about 4 weeks (around 7/2/2018).  Patient agreeable to call or return sooner for any concerns.

## 2018-08-08 ENCOUNTER — OFFICE VISIT (OUTPATIENT)
Dept: SURGERY | Facility: CLINIC | Age: 33
End: 2018-08-08

## 2018-08-08 VITALS
HEIGHT: 62 IN | SYSTOLIC BLOOD PRESSURE: 158 MMHG | OXYGEN SATURATION: 98 % | DIASTOLIC BLOOD PRESSURE: 92 MMHG | BODY MASS INDEX: 53.92 KG/M2 | WEIGHT: 293 LBS | TEMPERATURE: 97.8 F | HEART RATE: 97 BPM

## 2018-08-08 DIAGNOSIS — T88.4XXD DIFFICULT AIRWAY FOR INTUBATION, SUBSEQUENT ENCOUNTER: ICD-10-CM

## 2018-08-08 DIAGNOSIS — E66.01 MORBID OBESITY WITH BMI OF 60.0-69.9, ADULT (HCC): Primary | ICD-10-CM

## 2018-08-08 DIAGNOSIS — K43.2 INCISIONAL HERNIA, WITHOUT OBSTRUCTION OR GANGRENE: ICD-10-CM

## 2018-08-08 PROCEDURE — 99214 OFFICE O/P EST MOD 30 MIN: CPT | Performed by: SURGERY

## 2018-08-08 RX ORDER — NITROFURANTOIN 25; 75 MG/1; MG/1
CAPSULE ORAL
COMMUNITY
Start: 2018-08-04 | End: 2020-06-09

## 2018-08-08 RX ORDER — SUCRALFATE 1 G/1
TABLET ORAL
COMMUNITY
Start: 2018-08-04 | End: 2020-06-16

## 2018-08-08 RX ORDER — GLIMEPIRIDE 2 MG/1
2 TABLET ORAL
COMMUNITY
Start: 2018-07-10

## 2018-08-08 RX ORDER — PANTOPRAZOLE SODIUM 40 MG/1
TABLET, DELAYED RELEASE ORAL
COMMUNITY
Start: 2018-08-04 | End: 2020-06-16

## 2018-08-08 NOTE — PROGRESS NOTES
Subjective   Lynda Biswas is a 32 y.o. female.   Chief Complaint   Patient presents with   • Follow-up     incisional hernia         History of Present Illness   Lynda returns to the office today requesting further discussion regarding her incisional hernia.  Recall that this developed following a difficult open cholecystectomy.  It was at the time of her open cholecystectomy that it was discovered she had an extremely difficult airway, and in fact she required overnight intubation due to edema of the airway.  She developed the incisional hernia shortly thereafter and at one point, we did discuss proceeding with repair as she was having low-grade obstructive type symptoms and presented to the office with cellulitis overlying the hernia.  Arrangements were made, and she was taken to the operating room with plans for an incisional hernia repair due to my concerns of an impending incarceration/strangulation.  Unfortunately, that day, she was unable to be intubated in the operating room.  The procedure was aborted and she was awakened from anesthesia without any incisions having been made.  I did use that opportunity however to reduce the hernia.  Since that time, she has had gradual enlargement of the hernia defect which has actually alleviated her obstructive type symptoms and she has had no further episodes of skin cellulitis overlying the hernia.  She does continue to complain of discomfort related to the size of the hernia.  We have had multiple prior discussions regarding her BMI of 62 and the prohibitive nature of morbid obesity as it relates to elective hernia repairs.  I had previously offered her both supervised dietary counseling and a referral to bariatric surgery.  She presented to several of the nutrition appointments but then failed to keep several of them resulting in her dismissal from the nutrition clinic.  She was also referred to bariatric but failed to complete the necessary preoperative  "appointment paperwork and was subsequently never scheduled for an appointment.    That she \"has lost quite a bit of weight but then gained it back.\"  When questioned about this further, she is unable to qualify the exact amount of weight that she feels she has lost.  Based on our records, she has lost about 12 pounds since June.      The following portions of the patient's history were reviewed and updated as appropriate: allergies, current medications, past family history, past medical history, past social history, past surgical history and problem list.    Review of Systems   Constitutional: Negative for chills, fever and unexpected weight change.   HENT: Negative for hearing loss, trouble swallowing and voice change.    Eyes: Negative for visual disturbance.   Respiratory: Negative for apnea, cough, chest tightness, shortness of breath and wheezing.    Cardiovascular: Negative for chest pain, palpitations and leg swelling.   Gastrointestinal: Positive for abdominal distention, abdominal pain and constipation. Negative for anal bleeding, blood in stool, diarrhea, nausea, rectal pain and vomiting.   Endocrine: Negative for cold intolerance and heat intolerance.   Genitourinary: Negative for difficulty urinating, dysuria and flank pain.   Musculoskeletal: Negative for back pain and gait problem.   Skin: Negative for color change, rash and wound.   Neurological: Negative for dizziness, syncope, speech difficulty, weakness, light-headedness, numbness and headaches.   Hematological: Negative for adenopathy. Does not bruise/bleed easily.   Psychiatric/Behavioral: Negative for confusion. The patient is not nervous/anxious.        Objective    /92   Pulse 97   Temp 97.8 °F (36.6 °C)   Ht 157.5 cm (62\")   Wt (!) 153 kg (337 lb)   SpO2 98%   BMI 61.64 kg/m²     Physical Exam   Constitutional: She is oriented to person, place, and time. She appears well-developed and well-nourished.   HENT:   Head: Normocephalic " and atraumatic.   Eyes: No scleral icterus.   Cardiovascular: Regular rhythm.    Pulmonary/Chest: Effort normal.   Abdominal: Soft. She exhibits no distension. There is no tenderness.   There is a well-healed right subcostal incision.  Beneath this is a large incisional hernia with a fascial defect which is at least 7 cm in diameter.  The patient is supine, the hernia is easily reducible.  There is no overlying cellulitis.   Neurological: She is alert and oriented to person, place, and time.   Skin: Skin is warm and dry.   Psychiatric: She has a normal mood and affect. Her behavior is normal.       Assessment/Plan   Lynda was seen today for follow-up.    Diagnoses and all orders for this visit:    Morbid obesity with BMI of 60.0-69.9, adult (CMS/[facility])  -     Ambulatory Referral to Bariatric Surgery    Incisional hernia, without obstruction or gangrene    Difficult airway for intubation, subsequent encounter     Lynda Biswas is a 32-year-old female with morbid obesity with a BMI of 62 who has an incisional hernia following a difficult open cholecystectomy.  She also has a known difficult airway resulting in overnight intubation and mechanical ventilation following her cholecystectomy as well as an episode of being unable to be intubated for previously attempted hernia repair.  I again had a long discussion with Lynda, during which she became rather tearful, regarding her obesity as a contraindication to elective incisional hernia repair.  I have explained to her that while I understand completely her desire to proceed with hernia repair, at her current body mass index, she has a 100% chance of hernia recurrence.  I again explained to her that the reason for the previous attempted repair was based solely upon what appeared to be impending incarceration/strangulation in the face of skin cellulitis and obstructive type symptoms.  This is no longer the case.  All her hernia is quite large and at some point it would be  nice to repair this, she must lose weight before this is feasible.  I have explained to her the physiology of incisional hernias and their relationship to body mass index.  We have discussed the weight loss that would be necessary to achieve a body mass index of less than 40 as well as less than 35.  A body mass index of less than 35 is optimal for elective hernia repair.  She does understand this.  I have discussed with her her weight loss to date and her failed attempts.  I have reiterated to her that I think her best option for significant weight loss is bariatric surgery.  Additionally, we discussed her known difficult airway and I did reiterate her that weight loss would likely improve this to at least some degree.  I have encouraged her to at least consider allowing me to send her to the bariatric surgeons for evaluation.  At the conclusion of our discussion, she did agree to bariatric surgery referral.  I have placed a referral to Dr. Vasquez.  I have emphasized to the patient the importance of keeping her scheduled appointments or calling the office within a reasonable time frame to cancel these if she has no plans to keep the appointment.  She also verbalizes understanding of this.  At the conclusion of our discussion, Lynda seemed appreciative of the discussion and understanding of my recommendations.  She was counseled once again regarding the signs and symptoms of concern as it pertains to hernias.  She knows that should an emergency situation arise, she should seek immediate care.

## 2018-08-20 PROBLEM — T88.4XXA DIFFICULT AIRWAY FOR INTUBATION: Status: ACTIVE | Noted: 2018-08-20

## 2018-09-27 ENCOUNTER — TELEPHONE (OUTPATIENT)
Dept: SURGERY | Facility: CLINIC | Age: 33
End: 2018-09-27

## 2019-03-04 ENCOUNTER — TELEPHONE (OUTPATIENT)
Dept: SURGERY | Facility: CLINIC | Age: 34
End: 2019-03-04

## 2019-03-05 NOTE — TELEPHONE ENCOUNTER
I have placed 2 separate referrals to Dr. Vasquez for Ms. Biswas and she has failed to follow through submission of the required paperwork to have a consultation with their office.  At this point, I don't know if they will accept a new referral on her.  It appears that there is still an active referral in Epic from last year.

## 2020-06-09 RX ORDER — IBUPROFEN 200 MG
200 TABLET ORAL EVERY 6 HOURS PRN
COMMUNITY

## 2020-06-16 ENCOUNTER — TELEPHONE (OUTPATIENT)
Dept: BARIATRICS/WEIGHT MGMT | Facility: CLINIC | Age: 35
End: 2020-06-16

## 2020-06-16 ENCOUNTER — TELEMEDICINE (OUTPATIENT)
Dept: BARIATRICS/WEIGHT MGMT | Facility: CLINIC | Age: 35
End: 2020-06-16

## 2020-06-16 VITALS — BODY MASS INDEX: 53.92 KG/M2 | WEIGHT: 293 LBS | HEIGHT: 62 IN

## 2020-06-16 DIAGNOSIS — R53.83 FATIGUE, UNSPECIFIED TYPE: ICD-10-CM

## 2020-06-16 DIAGNOSIS — K21.9 GASTROESOPHAGEAL REFLUX DISEASE, UNSPECIFIED WHETHER ESOPHAGITIS PRESENT: ICD-10-CM

## 2020-06-16 DIAGNOSIS — E11.69 DIABETES MELLITUS TYPE 2 IN OBESE (HCC): ICD-10-CM

## 2020-06-16 DIAGNOSIS — K43.2 INCISIONAL HERNIA, WITHOUT OBSTRUCTION OR GANGRENE: ICD-10-CM

## 2020-06-16 DIAGNOSIS — E66.9 DIABETES MELLITUS TYPE 2 IN OBESE (HCC): ICD-10-CM

## 2020-06-16 DIAGNOSIS — R10.13 DYSPEPSIA: ICD-10-CM

## 2020-06-16 DIAGNOSIS — E66.01 MORBID OBESITY WITH BMI OF 60.0-69.9, ADULT (HCC): ICD-10-CM

## 2020-06-16 DIAGNOSIS — G47.33 OSA ON CPAP: ICD-10-CM

## 2020-06-16 DIAGNOSIS — R11.0 NAUSEA: ICD-10-CM

## 2020-06-16 DIAGNOSIS — I10 HYPERTENSION, UNSPECIFIED TYPE: Primary | ICD-10-CM

## 2020-06-16 DIAGNOSIS — R10.9 ABDOMINAL PAIN, UNSPECIFIED ABDOMINAL LOCATION: ICD-10-CM

## 2020-06-16 DIAGNOSIS — Z99.89 OSA ON CPAP: ICD-10-CM

## 2020-06-16 PROCEDURE — 99204 OFFICE O/P NEW MOD 45 MIN: CPT | Performed by: PHYSICIAN ASSISTANT

## 2020-06-16 RX ORDER — OXYBUTYNIN CHLORIDE 10 MG/1
10 TABLET, EXTENDED RELEASE ORAL DAILY
COMMUNITY
Start: 2020-06-02

## 2020-06-16 NOTE — PROGRESS NOTES
[facility] BARIATRIC SURGERY  [address]  [address]  [facility] [address]  [phone]      Patient  Name:  Lynda Biswas  :  1985      Date of Visit: 2020      Chief Complaint:  weight gain; unable to maintain weight loss    History of Present Illness:  Lynda Biswas is a 34 y.o. female who presents today for evaluation, education and consultation regarding metabolic and bariatric surgery. The patient is interested in sleeve gastrectomy with Dr. Vasquez.  Referred by:  Dr. Keysha Keenan.    hx lap converted to open melissa 2017, complicated by difficult intubation w/ postop hypoxia requiring prolonged ventilation in ICU.  Now w/ incisional hernia, s/p failed attempted repair 2017 d/t inability to intubate - was told she needs to lose weight prior to future attempts.      Lynda has been overweight for at least 30 years, has been 35 pounds or more overweight for at least 25 years, has been 100 pounds or more overweight for 15 or more years and started dieting at age 19.  Previous diet attempts include: High Protein, Low Carbohydrate, Low Fat, Calorie Counting and Slim Fast; Prozac.  Her maximum lifetime weight is 364 pounds.    As above, patient has been overweight for many years, with numerous unsuccessful dietary/weight loss attempts.  She now has obesity related comorbidities and as such has decided to pursue metabolic and bariatric surgery.  All past medical, surgical, social and family history have been obtained and discussed today, as pertinent to bariatric surgery.     Past Medical History:   Diagnosis Date   • Abdominal pain     r/t incisional hernia   • Anxiety    • Arthritis    • Bladder spasms    • Body piercing     EARS AND LEFT NIPPLE   • Depression    • Diabetes (CMS/HCC)     TAKES ORAL MED, NO INSULIN   • Dyspepsia    • Dyspnea on exertion    • Fatigue    • Fatty liver    • GERD (gastroesophageal reflux disease)     episodic, prn Pepto,  denies prior eval    • Hard of hearing     RIGHT SIDE   • Hard to intubate     Difficult intubation in 4/2017 with cholecystectomy.  Required prolonged ventilator support post-operatively.  Was UNABLE to be intubated at [facility] for hernia repair 9/2017. Procedure aborted due to difficulty of the airway.    • Hearing loss     PATIENT REPORTS VERY MILD AND THAT SHE DOES NOT USE HEARING DEVICES   • Hypertension    • Incisional hernia     r/t open melissa, needing repair   • Joint pain     mostly knees, daily Ibuprofen, no steroids   • Morbid obesity with BMI of 60.0-69.9, adult (CMS/HCC)    • MRSA (methicillin resistant Staphylococcus aureus)     recently, (L) breast/abdomen, treated w/ Bactrim    • Nausea     postprandial, worse since having gallbladder removed, denies eval   • PEDRO on CPAP     compliant   • PCOS (polycystic ovarian syndrome)     unable to tolerate Metformin, on OCPs   • Peripheral edema     prn Lasix 20mg, no KCl   • Psoriasis    • Tattoo     X1   • Wears glasses      Past Surgical History:   Procedure Laterality Date   • LAPAROSCOPIC APPENDECTOMY  2009   • [place] LAP,CHOLECYSTECTOMY N/A 4/17/2017    Procedure: CHOLECYSTECTOMY LAPAROSCOPIC CONVERTED TO OPEN WITH INTRAOPERATIVE CHOLEANGIOGRAM;  Surgeon: Keysha Keenan MD;  Location: [facility] OR;  Service: General       Allergies   Allergen Reactions   • Morphine Headache       Current Outpatient Medications:   •  busPIRone (BUSPAR) 5 MG tablet, Take 5 mg by mouth 3 (Three) Times a Day., Disp: , Rfl:   •  glimepiride (AMARYL) 2 MG tablet, Take 2 mg by mouth Every Morning Before Breakfast., Disp: , Rfl:   •  ibuprofen (ADVIL,MOTRIN) 200 MG tablet, Take 200 mg by mouth Every 6 (Six) Hours As Needed for Mild Pain ., Disp: , Rfl:   •  lisinopril-hydrochlorothiazide (PRINZIDE,ZESTORETIC) 20-12.5 MG per tablet, Take 1 tablet by mouth Daily., Disp: , Rfl:   •  norgestimate-ethinyl estradiol (SPRINTEC 28) 0.25-35 MG-MCG per tablet, Take 1 tablet by mouth  "Daily., Disp: , Rfl:   •  oxybutynin XL (DITROPAN-XL) 10 MG 24 hr tablet, Take 10 mg by mouth Daily., Disp: , Rfl:   •  sertraline (ZOLOFT) 50 MG tablet, Take 50 mg by mouth Daily., Disp: , Rfl:   •  Blood Glucose Monitoring Suppl (FREESTYLE LITE) device, , Disp: , Rfl:   •  cyclobenzaprine (FLEXERIL) 10 MG tablet, Take 10 mg by mouth 2 (Two) Times a Day As Needed for Muscle Spasms., Disp: , Rfl:   •  FREESTYLE LITE test strip, , Disp: , Rfl:   •  furosemide (LASIX) 20 MG tablet, Take 20 mg by mouth Daily As Needed., Disp: , Rfl:   •  Lancets (FREESTYLE) lancets, , Disp: , Rfl:   •  triamcinolone (KENALOG) 0.1 % cream, Apply 1 application topically Daily As Needed for Irritation., Disp: , Rfl:     Social History     Socioeconomic History   • Marital status: Single     Spouse name: Not on file   • Number of children: Not on file   • Years of education: Not on file   • Highest education level: Not on file   Tobacco Use   • Smoking status: Former Smoker     Years: 10.00     Types: Cigarettes     Last attempt to quit: 2019     Years since quittin.4   • Smokeless tobacco: Never Used   • Tobacco comment: very occassional smoker in the past   Substance and Sexual Activity   • Alcohol use: Not Currently     Alcohol/week: 2.0 - 4.0 standard drinks     Types: 2 - 4 Cans of beer per week     Comment: 2XMO/15-16YRS   • Drug use: Not Currently     Types: \"Crack\" cocaine, Marijuana     Comment: last used 10 yrs ago   • Sexual activity: Defer   Social History Narrative    Lives in [place], [place] - cares for her grandfather - formerly worked at [employer].     Family History   Problem Relation Age of Onset   • Hypertension Mother    • Kidney disease Maternal Aunt    • Obesity Father    • Hypertension Father    • Sleep apnea Brother    • Heart failure Maternal Grandmother    • Diabetes Maternal Grandmother    • Obesity Maternal Grandmother    • Diabetes Paternal Grandfather    • Obesity Paternal Grandfather        Review of " Systems:  Constitutional:  reports fatigue, weight gain and denies fevers, chills.  HEENT:  denies headache, ear pain or loss of hearing, blurred or double vision, nasal discharge or sore throat.  Cardiovascular:  reports HTN and denies hx heart disease, hx MI, chest pain, palpitations, hx DVT.  Respiratory:  reports dyspnea on exertion, sleep apnea and denies cough , wheezing, asthma, hx PE.  Gastrointestinal:  reports heartburn, nausea, abdominal pain and denies dysphagia, vomiting, IBS.  Genitourinary: denies history of  frequent UTI, incontinence, hematuria, dysuria, polyuria, polydipsia, renal insufficiency.    Musculoskeletal:  reports joint pain, arthritis and denies fibromyalgia and autoimmune disease.  Neurological: denies migraines, numbness /tingling, dizziness, confusion, seizure.  Psychiatric:  reports hx depression, hx anxiety and denies depressed mood, feeling anxious, bipolar disorder.  Endocrine:  reports glucose intolerance, diabetes and denies thyroid disease.  Hematologic:  denies bruising, bleeding disorder, hx anemia, hx blood transfusion.  Skin:  reports hx MRSA and denies rashes.    Physical Exam:  Vital Signs:  Weight: (!) 159 kg (350 lb)   Body mass index is 64.02 kg/m².              Note: height & weight patient reported.  Limited exam d/t virtual visit during COVID pandemic    Physical Exam   Constitutional: She is oriented to person, place, and time. She appears well-developed and well-nourished. No distress.   Eyes: No scleral icterus.   Pulmonary/Chest: Effort normal.   Abdominal:   large hernia, lower aspect of (R) subcostal incision   Neurological: She is alert and oriented to person, place, and time.   Psychiatric: She has a normal mood and affect.         Assessment:    Lynda Biswas is a 34 y.o. female with medically complicated obesity pursuing sleeve gastrectomy.    Patient Active Problem List   Diagnosis   • MRSA (methicillin resistant Staphylococcus aureus) infection   •  Incisional hernia, without obstruction or gangrene   • Morbid obesity with BMI of 60.0-69.9, adult (CMS/HCC)   • Difficult airway for intubation   • Failed intubation during puerperium   • Joint pain   • Hypertension   • Peripheral edema   • PCOS (polycystic ovarian syndrome)   • Anxiety   • Depression   • PEDRO on CPAP   • Nausea   • MRSA (methicillin resistant Staphylococcus aureus)   • GERD (gastroesophageal reflux disease)   • Diabetes (CMS/HCC)   • Fatty liver   • Bladder spasms   • Fatigue   • Abdominal pain   • Dyspepsia   • Dyspnea on exertion       Metabolic and bariatric surgery is deemed medically necessary given the following obesity related comorbidities including sleep apnea, diabetes, hypertension, knee pain and polycystic ovarian disease with current Weight: (!) 159 kg (350 lb) and Body mass index is 64.02 kg/m².    Plan:  Patient understands that bariatric surgery is not cosmetic surgery but rather a tool to help make a lifelong commitment to lifestyle changes including diet, exercise, behavior modifications, and healthy habits.  The patient has been educated today on those expected postoperative lifestyle changes.  Psychological and Nutritional consultations will be arranged prior to surgery.  Instructions on how to access [employer] (an internet based site w/ educational surgical videos) were given to the patient.  Recommended vitamin supplementation was reviewed.  The importance of avoiding ASA/ NSAIDS/ steroids/ tobacco/ hormones/ immunomodulators perioperatively was discussed in detail.  All questions/concerns have been addressed.      Further evaluation will include: CBC, CMP, Lipids, TSH, HgA1C, H.Pylori UBT, EKG, CXR, Gastric Emptying Study, UGI (rather than EGD given hx intubation issues), and Noncontrast Abd CT to eval incisional hernia/fascial defect further.    Additional clearances needed prior to surgery will include: Cardiology and Pulmonary.       Further input to follow pending the above.           Note: This was an audio and video enabled telemedicine encounter to comply with social distancing guidelines during the COVID-19 pandemic.  Consent was obtained prior to the visit.         NICHOLAS Alvarado

## 2020-06-17 NOTE — TELEPHONE ENCOUNTER
Patient is scheduled for a CT on 06/19/2020 in [place]. LVM for the patient again to ensure that she doesn't have any questions about why she is getting this CT scan, office number was provided

## 2020-06-19 ENCOUNTER — HOSPITAL ENCOUNTER (OUTPATIENT)
Dept: CT IMAGING | Facility: HOSPITAL | Age: 35
Discharge: HOME | End: 2020-06-19
Admitting: PHYSICIAN ASSISTANT

## 2020-06-19 DIAGNOSIS — R11.0 NAUSEA: ICD-10-CM

## 2020-06-19 DIAGNOSIS — R10.9 ABDOMINAL PAIN, UNSPECIFIED ABDOMINAL LOCATION: ICD-10-CM

## 2020-06-19 DIAGNOSIS — K43.2 INCISIONAL HERNIA, WITHOUT OBSTRUCTION OR GANGRENE: ICD-10-CM

## 2020-06-19 PROCEDURE — 74176 CT ABD & PELVIS W/O CONTRAST: CPT

## 2020-07-02 ENCOUNTER — TELEPHONE (OUTPATIENT)
Dept: BARIATRICS/WEIGHT MGMT | Facility: CLINIC | Age: 35
End: 2020-07-02

## 2020-07-02 NOTE — TELEPHONE ENCOUNTER
----- Message from Alana Vasquez MD sent at 6/24/2020  4:03 PM EDT -----    I don't think I can accomplish the sleeve but Marian may.     Please have her come by on Tuesday so he can perform a physical exam to see if she can proceed with the process.      ----- Message -----  From: Pawel Fonseca MD  Sent: 6/24/2020   1:26 PM EDT  To: Alana Vasquez MD    I think I could work around it - have her come by the office on a Tuesday so I can examine her.  Thanks!    ----- Message -----  From: Alana Vasquez MD  Sent: 6/24/2020  10:13 AM EDT  To: Pawel Fonseca MD    Could you take a look at this CT and tell me your thoughts?    Keysha Keenan referred her to me for sleeve.  She had previous lap to open melissa and now has huge incisional hernia with loss of domain right where I would need to put my RUQ ports.  She wants sleeve to lose weight so she can have the incisional hernia repaired.    I do not think I can technically accomplish the sleeve.      ----- Message -----  From: Adelina Santo PA  Sent: 6/23/2020   3:05 PM EDT  To: Alana Vasquez MD    Please review CT.    Pursuing LSG.  BMI 64 w/ large incisional hernia of lower aspect of (R) subcostal incision.  hx lap converted to open melissa 4/2017, complicated by difficult intubation w/ postop hypoxia requiring prolonged ventilation in ICU, further complicated by incisional hernia, s/p failed attempted repair 9/2017 d/t inability to intubate - was told she needs to lose weight prior to future attempts.

## 2020-07-02 NOTE — TELEPHONE ENCOUNTER
Pt notified that Dr Vasquez is concerned that her hernia may make the gastric sleeve more difficult to accomplish and that we need her to come into the office Tuesday 7/7/2020 @ 8:30 for an exam w/ Dr Fonseca. Pt verbalized her understanding that her hernia may make the gastrics sleeve more difficult to accomplish and that we need her to come in to the office on Tuesday 7/7/20 @ 8:30, and Richa has placed on your schedule

## 2020-07-07 ENCOUNTER — OFFICE VISIT (OUTPATIENT)
Dept: BARIATRICS/WEIGHT MGMT | Facility: CLINIC | Age: 35
End: 2020-07-07

## 2020-07-07 VITALS
DIASTOLIC BLOOD PRESSURE: 94 MMHG | HEIGHT: 62 IN | BODY MASS INDEX: 53.92 KG/M2 | RESPIRATION RATE: 18 BRPM | SYSTOLIC BLOOD PRESSURE: 160 MMHG | TEMPERATURE: 97.8 F | HEART RATE: 93 BPM | WEIGHT: 293 LBS | OXYGEN SATURATION: 99 %

## 2020-07-07 DIAGNOSIS — E66.01 MORBID OBESITY WITH BMI OF 60.0-69.9, ADULT (HCC): Primary | ICD-10-CM

## 2020-07-07 PROCEDURE — 99213 OFFICE O/P EST LOW 20 MIN: CPT | Performed by: PHYSICIAN ASSISTANT

## 2020-07-07 NOTE — PROGRESS NOTES
[facility] BARIATRIC SURGERY  [address]  [address]  [facility] [address] [phone]      Patient  Name:  Lynda Biswas  :  1985      Date of Visit: 2020      Chief Complaint:  hernia eval, pursuing LSG    History of Present Illness:  Lynda Biswas is a 34 y.o. female pursuing LSG.    Referred by:  Dr. Keysha Keenan.  hx lap converted to open melissa 2017, complicated by difficult intubation w/ postop hypoxia requiring prolonged ventilation in ICU.  Now w/ incisional hernia, s/p failed attempted repair 2017 d/t inability to intubate - was told she needs to lose weight prior to future attempts.    Noncontrast CT abd [facility] reveals large ventral hernia contains nonobstructed portions of the right hemicolon and small bowel.  Presents for in office eval today.      Past Medical History:   Diagnosis Date   • Abdominal pain     r/t incisional hernia   • Anxiety    • Arthritis    • Bladder spasms    • Body piercing     EARS AND LEFT NIPPLE   • Depression    • Diabetes (CMS/HCC)     TAKES ORAL MED, NO INSULIN   • Dyspepsia    • Dyspnea on exertion    • Fatigue    • Fatty liver    • GERD (gastroesophageal reflux disease)     episodic, prn Pepto, denies prior eval    • Hard of hearing     RIGHT SIDE   • Hard to intubate     Difficult intubation in 2017 with cholecystectomy.  Required prolonged ventilator support post-operatively.  Was UNABLE to be intubated at [facility] for hernia repair 2017. Procedure aborted due to difficulty of the airway.    • Hearing loss     PATIENT REPORTS VERY MILD AND THAT SHE DOES NOT USE HEARING DEVICES   • Hypertension    • Incisional hernia     r/t open melissa, needing repair   • Joint pain     mostly knees, daily Ibuprofen, no steroids   • Morbid obesity with BMI of 60.0-69.9, adult (CMS/HCC)    • MRSA (methicillin resistant Staphylococcus aureus)     recently, (L) breast/abdomen, treated w/ Bactrim    • Nausea      postprandial, worse since having gallbladder removed, denies eval   • PEDRO on CPAP     compliant   • PCOS (polycystic ovarian syndrome)     unable to tolerate Metformin, on OCPs   • Peripheral edema     prn Lasix 20mg, no KCl   • Psoriasis    • Tattoo     X1   • Wears glasses      Past Surgical History:   Procedure Laterality Date   • LAPAROSCOPIC APPENDECTOMY  2009   • [place] LAP,CHOLECYSTECTOMY N/A 4/17/2017    Procedure: CHOLECYSTECTOMY LAPAROSCOPIC CONVERTED TO OPEN WITH INTRAOPERATIVE CHOLEANGIOGRAM;  Surgeon: Keysha Keenan MD;  Location: [facility] OR;  Service: General       Allergies   Allergen Reactions   • Morphine Headache       Current Outpatient Medications:   •  Blood Glucose Monitoring Suppl (FREESTYLE LITE) device, , Disp: , Rfl:   •  busPIRone (BUSPAR) 5 MG tablet, Take 5 mg by mouth 3 (Three) Times a Day., Disp: , Rfl:   •  FREESTYLE LITE test strip, , Disp: , Rfl:   •  furosemide (LASIX) 20 MG tablet, Take 20 mg by mouth Daily As Needed., Disp: , Rfl:   •  glimepiride (AMARYL) 2 MG tablet, Take 2 mg by mouth Every Morning Before Breakfast., Disp: , Rfl:   •  ibuprofen (ADVIL,MOTRIN) 200 MG tablet, Take 200 mg by mouth Every 6 (Six) Hours As Needed for Mild Pain ., Disp: , Rfl:   •  Lancets (FREESTYLE) lancets, , Disp: , Rfl:   •  lisinopril-hydrochlorothiazide (PRINZIDE,ZESTORETIC) 20-12.5 MG per tablet, Take 1 tablet by mouth Daily., Disp: , Rfl:   •  norgestimate-ethinyl estradiol (SPRINTEC 28) 0.25-35 MG-MCG per tablet, Take 1 tablet by mouth Daily., Disp: , Rfl:   •  oxybutynin XL (DITROPAN-XL) 10 MG 24 hr tablet, Take 10 mg by mouth Daily., Disp: , Rfl:   •  sertraline (ZOLOFT) 50 MG tablet, Take 50 mg by mouth Daily., Disp: , Rfl:   •  triamcinolone (KENALOG) 0.1 % cream, Apply 1 application topically Daily As Needed for Irritation., Disp: , Rfl:   •  cyclobenzaprine (FLEXERIL) 10 MG tablet, Take 10 mg by mouth 2 (Two) Times a Day As Needed for Muscle Spasms., Disp: , Rfl:     Social  "History     Socioeconomic History   • Marital status: Single     Spouse name: Not on file   • Number of children: Not on file   • Years of education: Not on file   • Highest education level: Not on file   Tobacco Use   • Smoking status: Former Smoker     Years: 10.00     Types: Cigarettes     Last attempt to quit: 2019     Years since quittin.5   • Smokeless tobacco: Never Used   • Tobacco comment: very occassional smoker in the past   Substance and Sexual Activity   • Alcohol use: Not Currently     Alcohol/week: 2.0 - 4.0 standard drinks     Types: 2 - 4 Cans of beer per week     Comment: 2XMO/15-16YRS   • Drug use: Not Currently     Types: \"Crack\" cocaine, Marijuana     Comment: last used 10 yrs ago   • Sexual activity: Defer   Social History Narrative    Lives in [place], [place] - cares for her grandfather - formerly worked at [employer].     Family History   Problem Relation Age of Onset   • Hypertension Mother    • Kidney disease Maternal Aunt    • Obesity Father    • Hypertension Father    • Sleep apnea Brother    • Heart failure Maternal Grandmother    • Diabetes Maternal Grandmother    • Obesity Maternal Grandmother    • Diabetes Paternal Grandfather    • Obesity Paternal Grandfather        Review of Systems:  Constitutional:  reports fatigue, weight gain and denies fevers, chills.  HEENT:  denies headache, ear pain or loss of hearing, blurred or double vision, nasal discharge or sore throat.  Cardiovascular:  reports HTN and denies hx heart disease, hx MI, chest pain, palpitations, hx DVT.  Respiratory:  reports dyspnea on exertion, sleep apnea and denies cough , wheezing, asthma, hx PE.  Gastrointestinal:  reports heartburn, nausea, abdominal pain and denies dysphagia, vomiting, IBS.  Genitourinary: denies history of  frequent UTI, incontinence, hematuria, dysuria, polyuria, polydipsia, renal insufficiency.    Musculoskeletal:  reports joint pain, arthritis and denies fibromyalgia and autoimmune " disease.  Neurological: denies migraines, numbness /tingling, dizziness, confusion, seizure.  Psychiatric:  reports hx depression, hx anxiety and denies depressed mood, feeling anxious, bipolar disorder.  Endocrine:  reports glucose intolerance, diabetes and denies thyroid disease.  Hematologic:  denies bruising, bleeding disorder, hx anemia, hx blood transfusion.  Skin:  reports hx MRSA and denies rashes.    Physical Exam:  Vital Signs:  Weight: (!) 159 kg (351 lb 8 oz)   Body mass index is 64.29 kg/m².  Temp: 97.8 °F (36.6 °C)   Heart Rate: 93   BP: 160/94     Physical Exam   Constitutional: She appears well-developed and well-nourished. She is cooperative.   HENT:   wearing a mask   Eyes: Conjunctivae are normal. No scleral icterus.   Cardiovascular: Normal rate.   Pulmonary/Chest: Effort normal.   Abdominal: Soft. There is no tenderness.   large (R) ventral/incision hernia, long RUQ melissa scar   Musculoskeletal: Normal range of motion. She exhibits no edema.   Neurological: She is alert.   Skin: Skin is warm and dry. No rash noted.   Psychiatric: She has a normal mood and affect. Judgment normal.       Patient Active Problem List   Diagnosis   • MRSA (methicillin resistant Staphylococcus aureus) infection   • Incisional hernia, without obstruction or gangrene   • Morbid obesity with BMI of 60.0-69.9, adult (CMS/HCC)   • Difficult airway for intubation   • Failed intubation during puerperium   • Joint pain   • Hypertension   • Peripheral edema   • PCOS (polycystic ovarian syndrome)   • Anxiety   • Depression   • PEDRO on CPAP   • Nausea   • MRSA (methicillin resistant Staphylococcus aureus)   • GERD (gastroesophageal reflux disease)   • Diabetes (CMS/HCC)   • Fatty liver   • Bladder spasms   • Fatigue   • Abdominal pain   • Dyspepsia   • Dyspnea on exertion         Assessment:  Lynda Biswas is a 34 y.o. female w/ medically complicated obesity pursuing sleeve gastrectomy. Patient's Body mass index is 64.29  kg/m². BMI is above normal parameters. Recommendations include: MBS.  Metabolic and bariatric surgery is deemed medically necessary given the following obesity related comorbidities including sleep apnea, diabetes, hypertension, knee pain, polycystic ovarian disease, and incisional hernia.    Plan:  Seen & examined today w/ Dr. Fonseca.  Will proceed w/ MBS process.  Patient understands that bariatric surgery is not cosmetic surgery but rather a tool to help make a lifelong commitment to lifestyle changes including diet, exercise, behavior modifications, and healthy habits.  The patient has been educated today on those expected postoperative lifestyle changes.  Psychological and Nutritional consultations will be arranged prior to surgery.  The importance of avoiding ASA/ NSAIDS/ steroids/ tobacco perioperatively was discussed in detail.  All questions/concerns have been addressed.      Further evaluation, as previously discussed, will include: CBC, CMP, Lipids, TSH, HgA1C, H.Pylori UBT, EKG, CXR, Gastric Emptying Study, UGI (rather than EGD given hx intubation issues).    Additional clearances needed prior to surgery will include: Cardiology and Pulmonary.      Further input to follow.           NICHOLAS Alvarado

## 2020-10-13 ENCOUNTER — OFFICE VISIT (OUTPATIENT)
Dept: PULMONOLOGY | Facility: CLINIC | Age: 35
End: 2020-10-13

## 2020-10-13 VITALS
SYSTOLIC BLOOD PRESSURE: 126 MMHG | OXYGEN SATURATION: 98 % | BODY MASS INDEX: 53.92 KG/M2 | TEMPERATURE: 97.7 F | RESPIRATION RATE: 16 BRPM | HEIGHT: 62 IN | WEIGHT: 293 LBS | DIASTOLIC BLOOD PRESSURE: 78 MMHG | HEART RATE: 98 BPM

## 2020-10-13 DIAGNOSIS — G47.33 OBSTRUCTIVE SLEEP APNEA: Primary | ICD-10-CM

## 2020-10-13 DIAGNOSIS — E66.01 MORBID OBESITY, UNSPECIFIED OBESITY TYPE (HCC): ICD-10-CM

## 2020-10-13 PROCEDURE — 99204 OFFICE O/P NEW MOD 45 MIN: CPT | Performed by: INTERNAL MEDICINE

## 2020-10-13 NOTE — PROGRESS NOTES
CONSULT NOTE    Requested by:   Adelina Santo PA Catron, Mark Otis, PA      Chief Complaint   Patient presents with   • Consult   • Sleeping Problem       Subjective:  Lynda Biswas is a 34 y.o. female.     History of Present Illness   Patient comes in today for consultation because of sleep apnea.  The patient says that  she was diagnosed with sleep apnea 6 years ago.  It appears that she has been on a PAP device since then.    Patient doesn't report any issues with the device. Patient says that the compliance with the use of the equipment is good. Apart from occasional night, when she feels that the PAP device doesn't function properly, she says that her symptoms of fatigue & daytime sleepiness have been helped greatly with the use of PAP, as prescribed.      she feels that the PAP device occasionally does not function properly and she says that her symptoms of fatigue & daytime sleepiness have resurfaced.    Patient is not aware of snoring through the device.     she is not complaining of occasional headaches.    The patient describes no significant issues with her mask either.     Patient's sleep schedule was reviewed.     she does have a family history of PEDRO, in her brother.     Patient is under management for hypertension & diabetes.        The following portions of the patient's history were reviewed and updated as appropriate: allergies, current medications, past family history, past medical history, past social history and past surgical history.    Review of Systems   Constitutional: Negative for chills, fatigue and fever.   HENT: Negative for sinus pressure, sneezing and sore throat.    Respiratory: Positive for cough. Negative for chest tightness, shortness of breath and wheezing.    Cardiovascular: Negative for palpitations and leg swelling.   Psychiatric/Behavioral: Negative for sleep disturbance.   All other systems reviewed and are negative.      Past Medical History:   Diagnosis Date  "  • Abdominal pain     r/t incisional hernia   • Anxiety    • Arthritis    • Bladder spasms    • Body piercing     EARS AND LEFT NIPPLE   • Depression    • Diabetes (CMS/HCC)     TAKES ORAL MED, NO INSULIN   • Dyspepsia    • Dyspnea on exertion    • Fatigue    • Fatty liver    • GERD (gastroesophageal reflux disease)     episodic, prn Pepto, denies prior eval    • Hard of hearing     RIGHT SIDE   • Hard to intubate     Difficult intubation in 2017 with cholecystectomy.  Required prolonged ventilator support post-operatively.  Was UNABLE to be intubated at [facility] for hernia repair 2017. Procedure aborted due to difficulty of the airway.    • Hearing loss     PATIENT REPORTS VERY MILD AND THAT SHE DOES NOT USE HEARING DEVICES   • Hypertension    • Incisional hernia     r/t open melissa, needing repair   • Joint pain     mostly knees, daily Ibuprofen, no steroids   • Morbid obesity with BMI of 60.0-69.9, adult (CMS/HCC)    • MRSA (methicillin resistant Staphylococcus aureus)     recently, (L) breast/abdomen, treated w/ Bactrim    • Nausea     postprandial, worse since having gallbladder removed, denies eval   • PEDRO on CPAP     compliant   • PCOS (polycystic ovarian syndrome)     unable to tolerate Metformin, on OCPs   • Peripheral edema     prn Lasix 20mg, no KCl   • Psoriasis    • Tattoo     X1   • Wears glasses        Social History     Tobacco Use   • Smoking status: Former Smoker     Years: 10.00     Types: Cigarettes     Quit date:      Years since quittin.7   • Smokeless tobacco: Never Used   • Tobacco comment: very occassional smoker in the past   Substance Use Topics   • Alcohol use: Not Currently     Alcohol/week: 2.0 - 4.0 standard drinks     Types: 2 - 4 Cans of beer per week     Comment: 2XMO/15-16YRS         Objective:  Visit Vitals  /78   Pulse 98   Temp 97.7 °F (36.5 °C)   Resp 16   Ht 157.5 cm (62.01\")   Wt (!) 163 kg (359 lb)   SpO2 98%   BMI 65.65 kg/m²       Physical Exam  Vitals signs " reviewed.   Constitutional:       Appearance: She is well-developed.   HENT:      Head: Atraumatic.      Mouth/Throat:      Comments: Oropharynx was crowded.  Eyes:      Pupils: Pupils are equal, round, and reactive to light.   Neck:      Thyroid: No thyromegaly.      Vascular: No JVD.      Trachea: No tracheal deviation.      Comments: Increased adipose tissue.   Cardiovascular:      Rate and Rhythm: Normal rate and regular rhythm.   Pulmonary:      Effort: Pulmonary effort is normal. No respiratory distress.      Breath sounds: Normal breath sounds. No wheezing.   Musculoskeletal: Normal range of motion.      Comments: Gait was normal.   Skin:     General: Skin is warm and dry.   Neurological:      Mental Status: She is alert and oriented to person, place, and time.   Psychiatric:         Behavior: Behavior normal.         Assessment/Plan:  Diagnoses and all orders for this visit:    1. Obstructive sleep apnea (Primary)  -     BIPAP / CPAP Adjustment    2. Morbid obesity, unspecified obesity type (CMS/HCC)  -     BIPAP / CPAP Adjustment        Return in about 4 months (around 2/13/2021) for Joni/Kayley, ....Also 14 mths w/ Dr. Hartman.    DISCUSSION(if any):  Laboratory workup was also reviewed which showed   Lab Results   Component Value Date    CO2 27.0 09/12/2017     ===========================  ===========================    We will try to obtain her last sleep study results from the performing facility, if not already scanned in our system.     I told the patient that her symptoms are consistent with fairly well controlled sleep apnea.    Although her last sleep study was more than 5 years ago, her symptoms are under fair control. I have offered her a new device and AutoPap 8/20 will be ordered.     The patient was reminded to continue using the PAP device regularly, every night for atleast 4 hours.    If the symptoms do not improve, even after above-mentioned measures, then she may have to undergo a full  night titration study.    Patient was advised to call this office with any issues.    Weight loss was also discussed and advised.    She was asked to reestablish with Bariatric surgery program.       Dictated utilizing Dragon dictation.    This document was electronically signed by Alexa Hartman MD on 10/13/20 at 11:34 EDT

## 2020-10-23 ENCOUNTER — LAB (OUTPATIENT)
Dept: LAB | Facility: HOSPITAL | Age: 35
End: 2020-10-23

## 2020-10-23 DIAGNOSIS — R53.83 FATIGUE, UNSPECIFIED TYPE: ICD-10-CM

## 2020-10-23 DIAGNOSIS — K21.9 GASTROESOPHAGEAL REFLUX DISEASE: ICD-10-CM

## 2020-10-23 DIAGNOSIS — R10.13 DYSPEPSIA: ICD-10-CM

## 2020-10-23 DIAGNOSIS — Z99.89 OSA ON CPAP: ICD-10-CM

## 2020-10-23 DIAGNOSIS — G47.33 OSA ON CPAP: ICD-10-CM

## 2020-10-23 DIAGNOSIS — E66.9 DIABETES MELLITUS TYPE 2 IN OBESE (HCC): ICD-10-CM

## 2020-10-23 DIAGNOSIS — E11.69 DIABETES MELLITUS TYPE 2 IN OBESE (HCC): ICD-10-CM

## 2020-10-23 DIAGNOSIS — I10 HYPERTENSION, UNSPECIFIED TYPE: ICD-10-CM

## 2020-10-23 LAB
ALBUMIN SERPL-MCNC: 4.1 G/DL (ref 3.5–5.2)
ALBUMIN/GLOB SERPL: 1.1 G/DL
ALP SERPL-CCNC: 61 U/L (ref 39–117)
ALT SERPL W P-5'-P-CCNC: 16 U/L (ref 1–33)
ANION GAP SERPL CALCULATED.3IONS-SCNC: 8.1 MMOL/L (ref 5–15)
ANISOCYTOSIS BLD QL: ABNORMAL
AST SERPL-CCNC: 15 U/L (ref 1–32)
BILIRUB SERPL-MCNC: 0.4 MG/DL (ref 0–1.2)
BUN SERPL-MCNC: 9 MG/DL (ref 6–20)
BUN/CREAT SERPL: 14.8 (ref 7–25)
CALCIUM SPEC-SCNC: 9.5 MG/DL (ref 8.6–10.5)
CHLORIDE SERPL-SCNC: 101 MMOL/L (ref 98–107)
CHOLEST SERPL-MCNC: 153 MG/DL (ref 0–200)
CO2 SERPL-SCNC: 28.9 MMOL/L (ref 22–29)
CREAT SERPL-MCNC: 0.61 MG/DL (ref 0.57–1)
DEPRECATED RDW RBC AUTO: 39.1 FL (ref 37–54)
EOSINOPHIL # BLD MANUAL: 0.33 10*3/MM3 (ref 0–0.4)
EOSINOPHIL NFR BLD MANUAL: 3 % (ref 0.3–6.2)
ERYTHROCYTE [DISTWIDTH] IN BLOOD BY AUTOMATED COUNT: 15 % (ref 12.3–15.4)
GFR SERPL CREATININE-BSD FRML MDRD: 135 ML/MIN/1.73
GLOBULIN UR ELPH-MCNC: 3.6 GM/DL
GLUCOSE SERPL-MCNC: 97 MG/DL (ref 65–99)
HBA1C MFR BLD: 6.94 % (ref 4.8–5.6)
HCT VFR BLD AUTO: 39.4 % (ref 34–46.6)
HDLC SERPL-MCNC: 53 MG/DL (ref 40–60)
HGB BLD-MCNC: 12.5 G/DL (ref 12–15.9)
LDLC SERPL CALC-MCNC: 83 MG/DL (ref 0–100)
LDLC/HDLC SERPL: 1.55 {RATIO}
LYMPHOCYTES # BLD MANUAL: 2.65 10*3/MM3 (ref 0.7–3.1)
LYMPHOCYTES NFR BLD MANUAL: 11.1 % (ref 5–12)
LYMPHOCYTES NFR BLD MANUAL: 24.2 % (ref 19.6–45.3)
MCH RBC QN AUTO: 23.5 PG (ref 26.6–33)
MCHC RBC AUTO-ENTMCNC: 31.7 G/DL (ref 31.5–35.7)
MCV RBC AUTO: 74.1 FL (ref 79–97)
MICROCYTES BLD QL: ABNORMAL
MONOCYTES # BLD AUTO: 1.22 10*3/MM3 (ref 0.1–0.9)
NEUTROPHILS # BLD AUTO: 6.76 10*3/MM3 (ref 1.7–7)
NEUTROPHILS NFR BLD MANUAL: 61.6 % (ref 42.7–76)
PLAT MORPH BLD: NORMAL
PLATELET # BLD AUTO: 412 10*3/MM3 (ref 140–450)
PMV BLD AUTO: 10.4 FL (ref 6–12)
POIKILOCYTOSIS BLD QL SMEAR: ABNORMAL
POTASSIUM SERPL-SCNC: 4.2 MMOL/L (ref 3.5–5.2)
PROT SERPL-MCNC: 7.7 G/DL (ref 6–8.5)
RBC # BLD AUTO: 5.32 10*6/MM3 (ref 3.77–5.28)
SODIUM SERPL-SCNC: 138 MMOL/L (ref 136–145)
TRIGL SERPL-MCNC: 89 MG/DL (ref 0–150)
TSH SERPL DL<=0.05 MIU/L-ACNC: 1.02 UIU/ML (ref 0.27–4.2)
VLDLC SERPL-MCNC: 17 MG/DL (ref 5–40)
WBC # BLD AUTO: 10.97 10*3/MM3 (ref 3.4–10.8)
WBC MORPH BLD: NORMAL

## 2020-10-23 PROCEDURE — 85007 BL SMEAR W/DIFF WBC COUNT: CPT

## 2020-10-23 PROCEDURE — 36415 COLL VENOUS BLD VENIPUNCTURE: CPT

## 2020-10-23 PROCEDURE — 83036 HEMOGLOBIN GLYCOSYLATED A1C: CPT | Performed by: PHYSICIAN ASSISTANT

## 2020-10-23 PROCEDURE — 80050 GENERAL HEALTH PANEL: CPT | Performed by: PHYSICIAN ASSISTANT

## 2020-10-23 PROCEDURE — 80061 LIPID PANEL: CPT | Performed by: PHYSICIAN ASSISTANT

## 2020-11-03 ENCOUNTER — APPOINTMENT (OUTPATIENT)
Dept: NUCLEAR MEDICINE | Facility: HOSPITAL | Age: 35
End: 2020-11-03

## 2020-11-04 ENCOUNTER — APPOINTMENT (OUTPATIENT)
Dept: GENERAL RADIOLOGY | Facility: HOSPITAL | Age: 35
End: 2020-11-04

## 2020-11-16 ENCOUNTER — TELEPHONE (OUTPATIENT)
Dept: BARIATRICS/WEIGHT MGMT | Facility: CLINIC | Age: 35
End: 2020-11-16

## 2020-11-16 NOTE — TELEPHONE ENCOUNTER
Just FYI, the insurance has not approved the GES for the patient, we have went to an appeal process.  At this point I am waiting on the patient to allow permission for us to start an appeal on her behalf.      Thank you,   Jennifer

## 2020-12-10 ENCOUNTER — HOSPITAL ENCOUNTER (OUTPATIENT)
Dept: NUCLEAR MEDICINE | Facility: HOSPITAL | Age: 35
Discharge: HOME | End: 2020-12-10

## 2020-12-10 DIAGNOSIS — K21.9 GASTROESOPHAGEAL REFLUX DISEASE, UNSPECIFIED WHETHER ESOPHAGITIS PRESENT: ICD-10-CM

## 2020-12-10 DIAGNOSIS — R10.13 DYSPEPSIA: ICD-10-CM

## 2020-12-10 DIAGNOSIS — E11.69 DIABETES MELLITUS TYPE 2 IN OBESE (HCC): ICD-10-CM

## 2020-12-10 DIAGNOSIS — E66.9 DIABETES MELLITUS TYPE 2 IN OBESE (HCC): ICD-10-CM

## 2020-12-10 DIAGNOSIS — R11.0 NAUSEA: ICD-10-CM

## 2020-12-10 PROCEDURE — 0 TECHNETIUM SULFUR COLLOID: Performed by: PHYSICIAN ASSISTANT

## 2020-12-10 PROCEDURE — 78264 GASTRIC EMPTYING IMG STUDY: CPT

## 2020-12-10 PROCEDURE — A9541 TC99M SULFUR COLLOID: HCPCS | Performed by: PHYSICIAN ASSISTANT

## 2020-12-10 RX ADMIN — TECHNETIUM TC 99M SULFUR COLLOID 1 DOSE: KIT at 08:56

## 2021-08-03 ENCOUNTER — OFFICE VISIT (OUTPATIENT)
Dept: OBSTETRICS AND GYNECOLOGY | Facility: CLINIC | Age: 36
End: 2021-08-03

## 2021-08-03 VITALS
WEIGHT: 293 LBS | HEIGHT: 62 IN | DIASTOLIC BLOOD PRESSURE: 100 MMHG | BODY MASS INDEX: 53.92 KG/M2 | SYSTOLIC BLOOD PRESSURE: 160 MMHG

## 2021-08-03 DIAGNOSIS — Z32.00 POSSIBLE PREGNANCY, NOT CONFIRMED: ICD-10-CM

## 2021-08-03 DIAGNOSIS — Z12.4 SCREENING FOR CERVICAL CANCER: ICD-10-CM

## 2021-08-03 DIAGNOSIS — N91.5 OLIGOMENORRHEA, UNSPECIFIED TYPE: Primary | ICD-10-CM

## 2021-08-03 DIAGNOSIS — R10.2 PELVIC PAIN: ICD-10-CM

## 2021-08-03 DIAGNOSIS — Z11.3 ROUTINE SCREENING FOR STI (SEXUALLY TRANSMITTED INFECTION): ICD-10-CM

## 2021-08-03 LAB
B-HCG UR QL: NEGATIVE
INTERNAL NEGATIVE CONTROL: NEGATIVE
INTERNAL POSITIVE CONTROL: POSITIVE
Lab: NORMAL

## 2021-08-03 PROCEDURE — 81025 URINE PREGNANCY TEST: CPT | Performed by: PHYSICIAN ASSISTANT

## 2021-08-03 PROCEDURE — 99213 OFFICE O/P EST LOW 20 MIN: CPT | Performed by: PHYSICIAN ASSISTANT

## 2021-08-03 RX ORDER — METOCLOPRAMIDE 5 MG/1
TABLET ORAL
COMMUNITY
Start: 2021-08-01

## 2021-08-03 NOTE — PROGRESS NOTES
Subjective   Chief Complaint   Patient presents with   • Pelvic Pain     Patient is C/O pelvic and ovary pain, irregular periods. History of PCOS, Requesting an STD check today       Lynda Biswas is a 35 y.o. year old  presenting to be seen for complaint of pelvic pain, irregular menses, history of polycystic ovary syndrome.  She also is requesting screening for STIs.  She reports a long-term history of oligomenorrhea and irregular menses.  She typically will go 2 to 3 months between her cycles.  Her LMP was 2 months ago.  Reports when she does bleed the flow last 7 to 12 days.  Sometimes the blood appears very thin and watery and brown in appearance.  Lower pelvic pain is random and very crampy with occasional sharp pains. Nothing exacerbates or alleviates pain   Reports it has been several years since her last Pap smear.  She is not currently sexually active and states it is been several months since she has been sexually active.  She is having no symptoms of an STI.    Past Medical History:   Diagnosis Date   • Abdominal pain     r/t incisional hernia   • Abnormal Pap smear of cervix    • Anxiety    • Arthritis    • Bladder spasms    • Body piercing     EARS AND LEFT NIPPLE   • Depression    • Diabetes (CMS/HCC)     TAKES ORAL MED, NO INSULIN   • Dyspepsia    • Dyspnea on exertion    • Fatigue    • Fatty liver    • GERD (gastroesophageal reflux disease)     episodic, prn Pepto, denies prior eval    • Hard of hearing     RIGHT SIDE   • Hard to intubate     Difficult intubation in 2017 with cholecystectomy.  Required prolonged ventilator support post-operatively.  Was UNABLE to be intubated at [facility] for hernia repair 2017. Procedure aborted due to difficulty of the airway.    • Hearing loss     PATIENT REPORTS VERY MILD AND THAT SHE DOES NOT USE HEARING DEVICES   • HPV (human papilloma virus) infection    • Hypertension    • Incisional hernia     r/t open melissa, needing repair   • Joint pain      mostly knees, daily Ibuprofen, no steroids   • Migraine    • Morbid obesity with BMI of 60.0-69.9, adult (CMS/[facility])    • MRSA (methicillin resistant Staphylococcus aureus)     recently, (L) breast/abdomen, treated w/ Bactrim    • Nausea     postprandial, worse since having gallbladder removed, denies eval   • PEDRO on CPAP     compliant   • PCOS (polycystic ovarian syndrome)     unable to tolerate Metformin, on OCPs   • Peripheral edema     prn Lasix 20mg, no KCl   • PMS (premenstrual syndrome)    • Polycystic ovary syndrome    • Psoriasis    • Tattoo     X1   • Urinary tract infection    • Varicella    • Wears glasses         Current Outpatient Medications:   •  Blood Glucose Monitoring Suppl (FREESTYLE LITE) device, , Disp: , Rfl:   •  busPIRone (BUSPAR) 5 MG tablet, Take 5 mg by mouth 3 (Three) Times a Day., Disp: , Rfl:   •  cyclobenzaprine (FLEXERIL) 10 MG tablet, Take 10 mg by mouth 2 (Two) Times a Day As Needed for Muscle Spasms., Disp: , Rfl:   •  FREESTYLE LITE test strip, , Disp: , Rfl:   •  furosemide (LASIX) 20 MG tablet, Take 20 mg by mouth Daily As Needed., Disp: , Rfl:   •  glimepiride (AMARYL) 2 MG tablet, Take 2 mg by mouth Every Morning Before Breakfast., Disp: , Rfl:   •  ibuprofen (ADVIL,MOTRIN) 200 MG tablet, Take 200 mg by mouth Every 6 (Six) Hours As Needed for Mild Pain ., Disp: , Rfl:   •  Lancets (FREESTYLE) lancets, , Disp: , Rfl:   •  lisinopril-hydrochlorothiazide (PRINZIDE,ZESTORETIC) 20-12.5 MG per tablet, Take 1 tablet by mouth Daily., Disp: , Rfl:   •  metoclopramide (REGLAN) 5 MG tablet, TAKE 1 TABLET BY MOUTH 4 TIMES DAILY 30 MINUTES BEFORE MEALS AND BEDTIME AS NEEDED, Disp: , Rfl:   •  oxybutynin XL (DITROPAN-XL) 10 MG 24 hr tablet, Take 10 mg by mouth Daily., Disp: , Rfl:   •  sertraline (ZOLOFT) 50 MG tablet, Take 50 mg by mouth Daily., Disp: , Rfl:   •  triamcinolone (KENALOG) 0.1 % cream, Apply 1 application topically Daily As Needed for Irritation., Disp: , Rfl:   •   "norgestimate-ethinyl estradiol (SPRINTEC 28) 0.25-35 MG-MCG per tablet, Take 1 tablet by mouth Daily., Disp: , Rfl:    Allergies   Allergen Reactions   • Morphine Headache      Past Surgical History:   Procedure Laterality Date   • APPENDECTOMY     • LAPAROSCOPIC APPENDECTOMY     • LAPAROSCOPIC CHOLECYSTECTOMY     • [place] LAP,CHOLECYSTECTOMY N/A 2017    Procedure: CHOLECYSTECTOMY LAPAROSCOPIC CONVERTED TO OPEN WITH INTRAOPERATIVE CHOLEANGIOGRAM;  Surgeon: Keysha Keenan MD;  Location: [facility];  Service: General      Social History     Socioeconomic History   • Marital status: Single     Spouse name: Not on file   • Number of children: Not on file   • Years of education: Not on file   • Highest education level: Not on file   Tobacco Use   • Smoking status: Former Smoker     Years: 10.00     Types: Cigarettes     Quit date:      Years since quittin.5   • Smokeless tobacco: Never Used   • Tobacco comment: very occassional smoker in the past   Vaping Use   • Vaping Use: Never used   Substance and Sexual Activity   • Alcohol use: Not Currently     Alcohol/week: 2.0 - 4.0 standard drinks     Types: 2 - 4 Cans of beer per week     Comment: 2XMO/15-16YRS   • Drug use: Not Currently     Types: \"Crack\" cocaine, Marijuana     Comment: last used 10 yrs ago   • Sexual activity: Not Currently     Partners: Female, Male     Birth control/protection: Condom      Family History   Problem Relation Age of Onset   • Hypertension Mother    • Kidney disease Maternal Aunt    • Obesity Father    • Hypertension Father    • Sleep apnea Brother    • Heart failure Maternal Grandmother    • Diabetes Maternal Grandmother    • Obesity Maternal Grandmother    • Diabetes Paternal Grandfather    • Obesity Paternal Grandfather        Review of Systems   Constitutional: Negative for chills, diaphoresis and fever.   Gastrointestinal: Positive for abdominal pain. Negative for constipation, diarrhea, nausea and vomiting. " "  Genitourinary: Positive for menstrual problem, pelvic pain and urgency. Negative for difficulty urinating, dysuria and vaginal discharge.   All other systems reviewed and are negative.          Objective   /100   Ht 157.5 cm (62.01\")   Wt (!) 171 kg (376 lb)   LMP 06/03/2021 (Approximate)   Breastfeeding No   BMI 68.75 kg/m²     Physical Exam  Constitutional:       Appearance: Normal appearance. She is well-developed. She is morbidly obese.   Eyes:      General: Lids are normal.      Extraocular Movements: Extraocular movements intact.      Conjunctiva/sclera: Conjunctivae normal.   Abdominal:      General: Abdomen is protuberant.      Palpations: Abdomen is soft.      Tenderness: There is no abdominal tenderness.   Genitourinary:     Labia:         Right: No rash, tenderness or lesion.         Left: No rash, tenderness or lesion.       Urethra: No prolapse, urethral pain, urethral swelling or urethral lesion.      Vagina: No vaginal discharge, tenderness or lesions.      Cervix: No cervical motion tenderness, discharge, friability or lesion.      Adnexa:         Right: No mass or tenderness.          Left: No mass or tenderness.        Comments: Pap done  Cannot palpate uterus or ovaries with body habitus   Skin:     General: Skin is dry.      Findings: No bruising or lesion.   Neurological:      General: No focal deficit present.      Mental Status: She is alert and oriented to person, place, and time.   Psychiatric:         Attention and Perception: Attention normal.         Mood and Affect: Mood normal.         Speech: Speech normal.         Behavior: Behavior is cooperative.            Result Review :                   Assessment and Plan  Diagnoses and all orders for this visit:    1. Oligomenorrhea, unspecified type (Primary)    2. Pelvic pain  -     US Non-ob Transvaginal    3. Routine screening for STI (sexually transmitted infection)  -     Hepatitis B Surface Antigen  -     Hepatitis C " Antibody  -     HIV-1 / O / 2 Ag / Antibody 4th Generation  -     RPR  -     HSV 1 & 2 - Specific Antibody, IgG  -     NuSwab VG+ - Swab, Cervix    4. Possible pregnancy, not confirmed  -     POC Pregnancy, Urine    5. Screening for cervical cancer  -     Pap IG, Rfx HPV ASCU; Future      Patient Instructions   Follow up 1 week for pelvic ultrasound. Pending results will discuss further management and cycles regulation              This note was electronically signed.    Stella Sánchez PA-C   August 4, 2021

## 2021-08-04 LAB
HBV SURFACE AG SERPL QL IA: NEGATIVE
HCV AB S/CO SERPL IA: <0.1 S/CO RATIO (ref 0–0.9)
HIV 1+2 AB+HIV1 P24 AG SERPL QL IA: NON REACTIVE
HSV1 IGG SER IA-ACNC: 2.17 INDEX (ref 0–0.9)
HSV2 IGG SER IA-ACNC: 15.5 INDEX (ref 0–0.9)
RPR SER QL: NON REACTIVE

## 2021-08-04 NOTE — PATIENT INSTRUCTIONS
Follow up 1 week for pelvic ultrasound. Pending results will discuss further management and cycles regulation

## 2021-08-06 LAB
A VAGINAE DNA VAG QL NAA+PROBE: ABNORMAL SCORE
BVAB2 DNA VAG QL NAA+PROBE: ABNORMAL SCORE
C ALBICANS DNA VAG QL NAA+PROBE: NEGATIVE
C GLABRATA DNA VAG QL NAA+PROBE: NEGATIVE
C TRACH DNA VAG QL NAA+PROBE: NEGATIVE
MEGA1 DNA VAG QL NAA+PROBE: ABNORMAL SCORE
N GONORRHOEA DNA VAG QL NAA+PROBE: NEGATIVE
T VAGINALIS DNA VAG QL NAA+PROBE: NEGATIVE

## 2021-08-06 RX ORDER — METRONIDAZOLE 500 MG/1
500 TABLET ORAL 2 TIMES DAILY
Qty: 14 TABLET | Refills: 0 | Status: SHIPPED | OUTPATIENT
Start: 2021-08-06 | End: 2021-08-13

## 2021-08-12 ENCOUNTER — OFFICE VISIT (OUTPATIENT)
Dept: BARIATRICS/WEIGHT MGMT | Facility: CLINIC | Age: 36
End: 2021-08-12

## 2021-08-12 VITALS
BODY MASS INDEX: 53.92 KG/M2 | HEART RATE: 116 BPM | WEIGHT: 293 LBS | SYSTOLIC BLOOD PRESSURE: 124 MMHG | TEMPERATURE: 96.9 F | OXYGEN SATURATION: 99 % | RESPIRATION RATE: 18 BRPM | HEIGHT: 62 IN | DIASTOLIC BLOOD PRESSURE: 72 MMHG

## 2021-08-12 DIAGNOSIS — K31.84 GASTROPARESIS: ICD-10-CM

## 2021-08-12 DIAGNOSIS — G47.33 OBSTRUCTIVE SLEEP APNEA SYNDROME: ICD-10-CM

## 2021-08-12 DIAGNOSIS — E66.9 DIABETES MELLITUS TYPE 2 IN OBESE (HCC): ICD-10-CM

## 2021-08-12 DIAGNOSIS — K21.9 GASTROESOPHAGEAL REFLUX DISEASE, UNSPECIFIED WHETHER ESOPHAGITIS PRESENT: ICD-10-CM

## 2021-08-12 DIAGNOSIS — E28.2 PCOS (POLYCYSTIC OVARIAN SYNDROME): ICD-10-CM

## 2021-08-12 DIAGNOSIS — E11.69 DIABETES MELLITUS TYPE 2 IN OBESE (HCC): ICD-10-CM

## 2021-08-12 DIAGNOSIS — I10 ESSENTIAL HYPERTENSION: ICD-10-CM

## 2021-08-12 PROCEDURE — 99213 OFFICE O/P EST LOW 20 MIN: CPT | Performed by: SURGERY

## 2021-08-12 NOTE — PROGRESS NOTES
"[facility] Bariatric Surgery  [address]  [address]  [facility] [id number] [phone]        Patient Name: Lynda Biswas.  YOB: 1985      Date of Visit: 08/12/2021      Reason for Visit:  Super morbid obesity    HPI:  Lynda Biswas is a 35 y.o. female pursuing MBS.    From initial intake in 7/2020 by Adelina Santo:    \"Referred by:  Dr. Keysha Keenan.  hx lap converted to open melissa 4/2017, complicated by difficult intubation w/ postop hypoxia requiring prolonged ventilation in ICU.  Now w/ incisional hernia, s/p failed attempted repair 9/2017 d/t inability to intubate - was told she needs to lose weight prior to future attempts.    Noncontrast CT abd 6/19/20 @[facility] reveals large ventral hernia contains nonobstructed portions of the right hemicolon and small bowel.  Presents for in office eval today.\"    Plan was to defer EGD due to intubation issues and do UGI instead.    08/12/21 Update:    She has episodic heartburn.  Much better since gallbladder removed.      12/10/20 GES reviewed:  IMPRESSION:  Decreased gastric emptying from expected concerning for  delayed gastric emptying or gastroparesis.    Her PCP NICHOLAS De La Rosa put her on Reglan.  This has helped her symptoms of constipation immensely.  Also less bloating since starting Reglan.    She is working with PCP for supervised medical weight loss.    Former smoker.  Vapes CBD 1-2x per week to mitigate anxiety symptoms.    Dr. Loida Keenan had attempted lap melissa, but it was converted to open as pt did not tolerate CO2 insufflation.  She now has a large ventral incisional hernia at the Kocher incision.  Bariatric surgery has been recommended before repair.      Past Medical History:   Diagnosis Date   • Abdominal pain     r/t incisional hernia   • Abnormal Pap smear of cervix    • Anxiety    • Arthritis    • Bladder spasms    • Body piercing     EARS AND LEFT NIPPLE   • Depression    • Diabetes " (CMS/HCC)     TAKES ORAL MED, NO INSULIN   • Dyspepsia    • Dyspnea on exertion    • Fatigue    • Fatty liver    • Gastroparesis    • GERD (gastroesophageal reflux disease)     episodic, prn Pepto, denies prior eval    • Hard of hearing     RIGHT SIDE   • Hard to intubate     Difficult intubation in 4/2017 with cholecystectomy.  Required prolonged ventilator support post-operatively.  Was UNABLE to be intubated at [facility] for hernia repair 9/2017. Procedure aborted due to difficulty of the airway.    • Hearing loss     PATIENT REPORTS VERY MILD AND THAT SHE DOES NOT USE HEARING DEVICES   • HPV (human papilloma virus) infection    • Hypertension    • Incisional hernia     r/t open melissa, needing repair   • Joint pain     mostly knees, daily Ibuprofen, no steroids   • Migraine    • Morbid obesity with BMI of 60.0-69.9, adult (CMS/[facility])    • MRSA (methicillin resistant Staphylococcus aureus)     recently, (L) breast/abdomen, treated w/ Bactrim    • Nausea     postprandial, worse since having gallbladder removed, denies eval   • PEDRO on CPAP     compliant   • PCOS (polycystic ovarian syndrome)     unable to tolerate Metformin, on OCPs   • Peripheral edema     prn Lasix 20mg, no KCl   • PMS (premenstrual syndrome)    • Polycystic ovary syndrome    • Psoriasis    • Tattoo     X1   • Urinary tract infection    • Varicella    • Wears glasses      Past Surgical History:   Procedure Laterality Date   • LAPAROSCOPIC APPENDECTOMY  2009   • LAPAROSCOPIC CHOLECYSTECTOMY     • DE LAP,CHOLECYSTECTOMY N/A 4/17/2017    Procedure: CHOLECYSTECTOMY LAPAROSCOPIC CONVERTED TO OPEN WITH INTRAOPERATIVE CHOLEANGIOGRAM;  Surgeon: Keysha Keenan MD;  Location: [facility];  Service: General     Outpatient Medications Marked as Taking for the 8/12/21 encounter (Office Visit) with Alana Vasquez MD   Medication Sig Dispense Refill   • busPIRone (BUSPAR) 5 MG tablet Take 5 mg by mouth 3 (Three) Times a Day.     • cyclobenzaprine  "(FLEXERIL) 10 MG tablet Take 10 mg by mouth 2 (Two) Times a Day As Needed for Muscle Spasms.     • furosemide (LASIX) 20 MG tablet Take 20 mg by mouth Daily As Needed.     • glimepiride (AMARYL) 2 MG tablet Take 2 mg by mouth Every Morning Before Breakfast.     • ibuprofen (ADVIL,MOTRIN) 200 MG tablet Take 200 mg by mouth Every 6 (Six) Hours As Needed for Mild Pain .     • lisinopril-hydrochlorothiazide (PRINZIDE,ZESTORETIC) 20-12.5 MG per tablet Take 1 tablet by mouth Daily.     • metoclopramide (REGLAN) 5 MG tablet TAKE 1 TABLET BY MOUTH 4 TIMES DAILY 30 MINUTES BEFORE MEALS AND BEDTIME AS NEEDED     • metroNIDAZOLE (Flagyl) 500 MG tablet Take 1 tablet by mouth 2 (Two) Times a Day for 7 days. 14 tablet 0   • oxybutynin XL (DITROPAN-XL) 10 MG 24 hr tablet Take 10 mg by mouth Daily.     • sertraline (ZOLOFT) 50 MG tablet Take 50 mg by mouth Daily.       Allergies   Allergen Reactions   • Morphine Headache       Social History     Socioeconomic History   • Marital status: Single     Spouse name: Not on file   • Number of children: Not on file   • Years of education: Not on file   • Highest education level: Not on file   Tobacco Use   • Smoking status: Former Smoker     Years: 10.00     Types: Cigarettes     Quit date:      Years since quittin.6   • Smokeless tobacco: Never Used   • Tobacco comment: very occassional smoker in the past   Vaping Use   • Vaping Use: Some days   • Substances: CBD, vapes 1-2x per week   Substance and Sexual Activity   • Alcohol use: Not Currently     Alcohol/week: 2.0 - 4.0 standard drinks     Types: 2 - 4 Cans of beer per week     Comment: 2XMO/15-16YRS   • Drug use: Not Currently     Types: \"Crack\" cocaine, Marijuana     Comment: last used 10 yrs ago   • Sexual activity: Not Currently     Partners: Female, Male     Birth control/protection: Condom       Vitals:    21 1133   BP: 124/72   Pulse: 116   Resp: 18   Temp: 96.9 °F (36.1 °C)   SpO2: 99%     Weight (!) 166 kg (367 " lb)  Body mass index is 67.13 kg/m².    Physical Exam  Constitutional:       General: She is not in acute distress.     Appearance: She is well-developed. She is not diaphoretic.   HENT:      Head: Normocephalic and atraumatic.      Mouth/Throat:      Pharynx: No oropharyngeal exudate.   Eyes:      Conjunctiva/sclera: Conjunctivae normal.      Pupils: Pupils are equal, round, and reactive to light.   Pulmonary:      Effort: Pulmonary effort is normal. No respiratory distress.   Abdominal:      General: There is no distension.      Palpations: Abdomen is soft.      Comments: Kocher incision in RUQ with large, soft incisional hernia without TTP or skin changes.   Skin:     General: Skin is warm and dry.      Coloration: Skin is not pale.   Neurological:      Mental Status: She is alert and oriented to person, place, and time.      Cranial Nerves: No cranial nerve deficit.   Psychiatric:         Behavior: Behavior normal.         Thought Content: Thought content normal.           Assessment:      ICD-10-CM ICD-9-CM   1. Body mass index (BMI) of 60.0-69.9 in adult (CMS/[facility])  Z68.44 V85.44   2. Essential hypertension  I10 401.9   3. PCOS (polycystic ovarian syndrome)  E28.2 256.4   4. Obstructive sleep apnea syndrome  G47.33 327.23   5. Gastroesophageal reflux disease, unspecified whether esophagitis present  K21.9 530.81   6. Diabetes mellitus type 2 in obese (CMS/[facility])  E11.69 250.00    E66.9 278.00   7. Gastroparesis  K31.84 536.3       Plan:  We will follow the previous plan from [facility]:    CBC, CMP, Lipids, TSH, HgA1C, H.Pylori UBT, EKG, CXR, Gastric Emptying Study, UGI (rather than EGD given hx intubation issues).    Additional clearances needed prior to surgery will include: Cardiology and Pulmonary.  Pt is working on this.      Pt advised to get UGI done--I reordered b/c order was old.

## 2021-08-18 ENCOUNTER — OFFICE VISIT (OUTPATIENT)
Dept: OBSTETRICS AND GYNECOLOGY | Facility: CLINIC | Age: 36
End: 2021-08-18

## 2021-08-18 VITALS
SYSTOLIC BLOOD PRESSURE: 128 MMHG | BODY MASS INDEX: 53.92 KG/M2 | DIASTOLIC BLOOD PRESSURE: 80 MMHG | HEIGHT: 62 IN | WEIGHT: 293 LBS

## 2021-08-18 DIAGNOSIS — N91.2 AMENORRHEA: ICD-10-CM

## 2021-08-18 DIAGNOSIS — Z12.4 SCREENING FOR CERVICAL CANCER: ICD-10-CM

## 2021-08-18 DIAGNOSIS — N91.5 OLIGOMENORRHEA, UNSPECIFIED TYPE: Primary | ICD-10-CM

## 2021-08-18 PROCEDURE — 99214 OFFICE O/P EST MOD 30 MIN: CPT | Performed by: PHYSICIAN ASSISTANT

## 2021-08-18 RX ORDER — MEDROXYPROGESTERONE ACETATE 10 MG/1
TABLET ORAL
Qty: 22 TABLET | Refills: 6 | Status: SHIPPED | OUTPATIENT
Start: 2021-08-18 | End: 2021-12-08 | Stop reason: SDUPTHER

## 2021-08-18 NOTE — PROGRESS NOTES
Subjective   Chief Complaint   Patient presents with   • Follow-up     TVS for oligomenorrhea       Lynda Biswas is a 35 y.o. year old  presenting to be seen for follow-up.  Patient had been seen recently with history of oligomenorrhea.  She would typically skip menses for 2 to 3 months at a time. Has previously been diagnosed with PCOS  Her LMP at this time has been 2021.  Transvaginal ultrasound done in the office today notes very limited scan due to her morbid obesity.  Her uterus is anteverted.  Her endometrium is 6.7 mm.  Ovaries were not visualized.  There is no free fluid or adnexal masses.  Not sexually active     Past Medical History:   Diagnosis Date   • Abdominal pain     r/t incisional hernia   • Abnormal Pap smear of cervix    • Anxiety    • Arthritis    • Bladder spasms    • Body piercing     EARS AND LEFT NIPPLE   • Depression    • Diabetes (CMS/HCC)     TAKES ORAL MED, NO INSULIN   • Dyspepsia    • Dyspnea on exertion    • Fatigue    • Fatty liver    • Gastroparesis    • GERD (gastroesophageal reflux disease)     episodic, prn Pepto, denies prior eval    • Hard of hearing     RIGHT SIDE   • Hard to intubate     Difficult intubation in 2017 with cholecystectomy.  Required prolonged ventilator support post-operatively.  Was UNABLE to be intubated at [facility] for hernia repair 2017. Procedure aborted due to difficulty of the airway.    • Hearing loss     PATIENT REPORTS VERY MILD AND THAT SHE DOES NOT USE HEARING DEVICES   • HPV (human papilloma virus) infection    • Hypertension    • Incisional hernia     r/t open melissa, needing repair   • Joint pain     mostly knees, daily Ibuprofen, no steroids   • Migraine    • Morbid obesity with BMI of 60.0-69.9, adult (CMS/HCC)    • MRSA (methicillin resistant Staphylococcus aureus)     recently, (L) breast/abdomen, treated w/ Bactrim    • Nausea     postprandial, worse since having gallbladder removed, denies eval   • PEDRO on CPAP     compliant    • PCOS (polycystic ovarian syndrome)     unable to tolerate Metformin, on OCPs   • Peripheral edema     prn Lasix 20mg, no KCl   • PMS (premenstrual syndrome)    • Polycystic ovary syndrome    • Psoriasis    • Tattoo     X1   • Urinary tract infection    • Varicella    • Wears glasses         Current Outpatient Medications:   •  busPIRone (BUSPAR) 5 MG tablet, Take 5 mg by mouth 3 (Three) Times a Day., Disp: , Rfl:   •  cyclobenzaprine (FLEXERIL) 10 MG tablet, Take 10 mg by mouth 2 (Two) Times a Day As Needed for Muscle Spasms., Disp: , Rfl:   •  furosemide (LASIX) 20 MG tablet, Take 20 mg by mouth Daily As Needed., Disp: , Rfl:   •  glimepiride (AMARYL) 2 MG tablet, Take 2 mg by mouth Every Morning Before Breakfast., Disp: , Rfl:   •  ibuprofen (ADVIL,MOTRIN) 200 MG tablet, Take 200 mg by mouth Every 6 (Six) Hours As Needed for Mild Pain ., Disp: , Rfl:   •  lisinopril-hydrochlorothiazide (PRINZIDE,ZESTORETIC) 20-12.5 MG per tablet, Take 1 tablet by mouth Daily., Disp: , Rfl:   •  metoclopramide (REGLAN) 5 MG tablet, TAKE 1 TABLET BY MOUTH 4 TIMES DAILY 30 MINUTES BEFORE MEALS AND BEDTIME AS NEEDED, Disp: , Rfl:   •  oxybutynin XL (DITROPAN-XL) 10 MG 24 hr tablet, Take 10 mg by mouth Daily., Disp: , Rfl:   •  sertraline (ZOLOFT) 50 MG tablet, Take 50 mg by mouth Daily., Disp: , Rfl:   •  medroxyPROGESTERone (Provera) 10 MG tablet, Take 2 tablets po days 20-30 of the month, Disp: 22 tablet, Rfl: 6   Allergies   Allergen Reactions   • Morphine Headache      Past Surgical History:   Procedure Laterality Date   • LAPAROSCOPIC APPENDECTOMY  2009   • LAPAROSCOPIC CHOLECYSTECTOMY     • [place] LAP,CHOLECYSTECTOMY N/A 4/17/2017    Procedure: CHOLECYSTECTOMY LAPAROSCOPIC CONVERTED TO OPEN WITH INTRAOPERATIVE CHOLEANGIOGRAM;  Surgeon: Keysha Keenan MD;  Location: [facility];  Service: General      Social History     Socioeconomic History   • Marital status: Single     Spouse name: Not on file   • Number of  "children: Not on file   • Years of education: Not on file   • Highest education level: Not on file   Tobacco Use   • Smoking status: Former Smoker     Years: 10.00     Types: Cigarettes     Quit date: 2019     Years since quittin.6   • Smokeless tobacco: Never Used   • Tobacco comment: very occassional smoker in the past   Vaping Use   • Vaping Use: Some days   • Substances: CBD, vapes 1-2x per week   Substance and Sexual Activity   • Alcohol use: Not Currently     Alcohol/week: 2.0 - 4.0 standard drinks     Types: 2 - 4 Cans of beer per week     Comment: [id number]   • Drug use: Not Currently     Types: \"Crack\" cocaine, Marijuana     Comment: last used 10 yrs ago   • Sexual activity: Not Currently     Partners: Female, Male     Birth control/protection: Condom      Family History   Problem Relation Age of Onset   • Hypertension Mother    • Kidney disease Maternal Aunt    • Obesity Father    • Hypertension Father    • Sleep apnea Brother    • Heart failure Maternal Grandmother    • Diabetes Maternal Grandmother    • Obesity Maternal Grandmother    • Diabetes Paternal Grandfather    • Obesity Paternal Grandfather        Review of Systems   Constitutional: Negative for chills, diaphoresis and fever.   Gastrointestinal: Negative for abdominal pain, constipation, diarrhea, nausea and vomiting.   Genitourinary: Positive for menstrual problem. Negative for difficulty urinating and dysuria.           Objective   /80   Ht 157.5 cm (62\")   Wt (!) 168 kg (370 lb 9.6 oz)   Breastfeeding No   BMI 67.78 kg/m²     Physical Exam       Result Review :   The following data was reviewed by: Stella Sánchez PA-C on 2021:    Data reviewed: pelvic ultrasound            Assessment and Plan  Diagnoses and all orders for this visit:    1. Oligomenorrhea, unspecified type (Primary)    2. Amenorrhea    Other orders  -     medroxyPROGESTERone (Provera) 10 MG tablet; Take 2 tablets po days 20-30 of the month  Dispense: " 22 tablet; Refill: 6      Patient Instructions   Patient is counseled regarding the need for cycling regularly.  Discussed a trial of monthly Provera and patient is agreeable to start this.  We will follow-up in 4 months to review or as needed.             This note was electronically signed.    Stella Sánchez PA-C   August 18, 2021

## 2021-08-18 NOTE — PATIENT INSTRUCTIONS
Patient is counseled regarding the need for cycling regularly.  Discussed a trial of monthly Provera and patient is agreeable to start this.  We will follow-up in 4 months to review or as needed.

## 2021-08-25 ENCOUNTER — LAB (OUTPATIENT)
Dept: LAB | Facility: HOSPITAL | Age: 36
End: 2021-08-25

## 2021-08-25 ENCOUNTER — TRANSCRIBE ORDERS (OUTPATIENT)
Dept: BARIATRICS/WEIGHT MGMT | Facility: CLINIC | Age: 36
End: 2021-08-25

## 2021-08-25 DIAGNOSIS — Z01.818 PRE-OP EXAM: Primary | ICD-10-CM

## 2021-08-25 DIAGNOSIS — Z01.818 PRE-OP TESTING: Primary | ICD-10-CM

## 2021-08-25 LAB — SARS-COV-2 RNA PNL SPEC NAA+PROBE: NOT DETECTED

## 2021-08-25 PROCEDURE — U0004 COV-19 TEST NON-CDC HGH THRU: HCPCS

## 2021-08-25 PROCEDURE — C9803 HOPD COVID-19 SPEC COLLECT: HCPCS

## 2021-08-27 ENCOUNTER — HOSPITAL ENCOUNTER (OUTPATIENT)
Dept: GENERAL RADIOLOGY | Facility: HOSPITAL | Age: 36
Discharge: HOME | End: 2021-08-27
Admitting: SURGERY

## 2021-08-27 DIAGNOSIS — K21.9 GASTROESOPHAGEAL REFLUX DISEASE, UNSPECIFIED WHETHER ESOPHAGITIS PRESENT: ICD-10-CM

## 2021-08-27 DIAGNOSIS — K31.84 GASTROPARESIS: ICD-10-CM

## 2021-08-27 PROCEDURE — 74246 X-RAY XM UPR GI TRC 2CNTRST: CPT

## 2021-09-13 ENCOUNTER — TELEMEDICINE (OUTPATIENT)
Dept: PSYCHIATRY | Facility: CLINIC | Age: 36
End: 2021-09-13

## 2021-09-13 DIAGNOSIS — F33.0 MILD EPISODE OF RECURRENT MAJOR DEPRESSIVE DISORDER (HCC): Primary | ICD-10-CM

## 2021-09-13 DIAGNOSIS — F41.1 GENERALIZED ANXIETY DISORDER: ICD-10-CM

## 2021-09-13 DIAGNOSIS — E66.01 MORBID OBESITY WITH BMI OF 60.0-69.9, ADULT (HCC): ICD-10-CM

## 2021-09-13 PROCEDURE — 90792 PSYCH DIAG EVAL W/MED SRVCS: CPT
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
## 2021-09-16 ENCOUNTER — TELEPHONE (OUTPATIENT)
Dept: OBSTETRICS AND GYNECOLOGY | Facility: CLINIC | Age: 36
End: 2021-09-16

## 2021-09-16 NOTE — TELEPHONE ENCOUNTER
Spoke with patient.  She took her first round of Provera August 20-30.  She started spotting on September 5 and 6 but then on September 9 she started heavier bleeding.  She has continued with bleeding through today however the bleeding has lightened up today.  She is advised not to take the Provera for September and if her bleeding stops over the next 3 to 4 days then we will go with taking the Provera October 1-10.

## 2021-09-16 NOTE — TELEPHONE ENCOUNTER
----- Message from Caitie Joseph sent at 9/16/2021  9:29 AM EDT -----  Regarding: MED QUESTION  Patient sees Ashlyn and states that she is taking provera. She is due to take another pack and is still bleeding heavy. Requesting a return call as to whether or not she should start the next round.    Patient call back: [phone]

## 2021-11-22 ENCOUNTER — TELEPHONE (OUTPATIENT)
Dept: OBSTETRICS AND GYNECOLOGY | Facility: CLINIC | Age: 36
End: 2021-11-22

## 2021-11-24 ENCOUNTER — OFFICE VISIT (OUTPATIENT)
Dept: PULMONOLOGY | Facility: CLINIC | Age: 36
End: 2021-11-24

## 2021-11-24 VITALS
HEART RATE: 94 BPM | OXYGEN SATURATION: 97 % | BODY MASS INDEX: 53.92 KG/M2 | WEIGHT: 293 LBS | DIASTOLIC BLOOD PRESSURE: 80 MMHG | SYSTOLIC BLOOD PRESSURE: 128 MMHG | HEIGHT: 62 IN | RESPIRATION RATE: 18 BRPM

## 2021-11-24 DIAGNOSIS — Z01.811 PREOP PULMONARY/RESPIRATORY EXAM: ICD-10-CM

## 2021-11-24 DIAGNOSIS — G47.33 OBSTRUCTIVE SLEEP APNEA: Primary | ICD-10-CM

## 2021-11-24 DIAGNOSIS — E66.01 MORBID OBESITY, UNSPECIFIED OBESITY TYPE (HCC): ICD-10-CM

## 2021-11-24 DIAGNOSIS — R06.02 SOB (SHORTNESS OF BREATH): Primary | ICD-10-CM

## 2021-11-24 PROCEDURE — 94060 EVALUATION OF WHEEZING: CPT | Performed by: INTERNAL MEDICINE

## 2021-11-24 PROCEDURE — 99214 OFFICE O/P EST MOD 30 MIN: CPT | Performed by: INTERNAL MEDICINE

## 2021-11-24 RX ORDER — CARVEDILOL 25 MG/1
25 TABLET ORAL
COMMUNITY
Start: 2021-11-15

## 2021-11-24 NOTE — PROGRESS NOTES
"  Chief Complaint   Patient presents with   • Follow-up   • Sleeping Problem       Subjective   Lynda Biswas is a 36 y.o. female.     History of Present Illness   Patient was evaluated today for follow up of Sleep apnea.     Patient doesn't report any issues with the PAP device. The patient describes no significant issues with her mask either.     Patient says that the compliance with the use of the equipment is good.     Patient says that her symptoms of fatigue & daytime sleepiness have been helped greatly with the use of PAP, as prescribed.     Used to smoke 1 cigar a week or less. Now vapes occasionally.    No real shortness of breath.    No known FH of asthma or COPD.     The following portions of the patient's history were reviewed and updated as appropriate: allergies, current medications, past family history, past medical history, past social history and past surgical history.    Review of Systems   Constitutional: Negative for chills and fever.   HENT: Positive for postnasal drip. Negative for rhinorrhea, sinus pressure, sneezing and sore throat.    Respiratory: Negative for cough, shortness of breath and wheezing.    Psychiatric/Behavioral: Negative for sleep disturbance.       Objective   Visit Vitals  /80   Pulse 94   Resp 18   Ht 157.5 cm (62\")   Wt (!) 163 kg (359 lb)   SpO2 97%   BMI 65.66 kg/m²       Physical Exam  Vitals reviewed.   Constitutional:       Appearance: She is well-developed.   HENT:      Head: Atraumatic.      Mouth/Throat:      Comments: Oropharynx was crowded.  Cardiovascular:      Rate and Rhythm: Normal rate and regular rhythm.      Heart sounds: Normal heart sounds. No murmur heard.      Pulmonary:      Effort: Pulmonary effort is normal. No respiratory distress.      Breath sounds: Normal breath sounds. No wheezing or rales.   Musculoskeletal:      Comments: Gait was normal.   Neurological:      Mental Status: She is alert and oriented to person, place, and time. "           Assessment/Plan   Diagnoses and all orders for this visit:    1. Obstructive sleep apnea (Primary)  -     BIPAP / CPAP Adjustment    2. Morbid obesity, unspecified obesity type (HCC)  -     BIPAP / CPAP Adjustment    3. Preop pulmonary/respiratory exam  -     Pulmonary Function Test           Return in about 9 months (around 8/24/2022) for Joni/Kayley.    DISCUSSION (if any):  I reviewed the results of last sleep study in detail. I informed her that the apnea hypopnea index was 19 / hr. This was home study, performed in 2016.    Continue treatment with AutoPAP at a pressure of 8/20, with a nasal pillows.    Patient seems to be compliant with PAP device, based on the available data and her account of improved symptoms.     Compliance data was obtained from the her device/DME company.    Sleep hygiene measures were discussed in great detail including need to follow a strict bedtime and to avoid electronic devices in bed and close to bedtime.    she was also asked to avoid caffeinated or alcoholic beverages within 4 to 6 hours of bedtime.    She is undergoing evaluation for bariatric surgery.     The patient was once again reminded to continue using the PAP device regularly, every night for atleast 4 hours.    Spirometry was ordered and reviewed.     ============================  ============================    From Pulmonary stand point, her risk for the proposed procedure appears to be.                Low     Postoperative recommendations:            * Out of bed to chair, as soon as feasible.            * Albuterol & Atrovent nebulizers Q4hr PRN            * Incentive spirometry every hour.            * Minimize the use of NG tube.            * Keep O2sat at 88% or more, with minimum supplemental O2.            * Minimize anesthesia time, as much as possible            * Inform anesthesiologist of her diagnosis of obstructive sleep apnea            * Consider ABG, preop and postop, if necessary.             * Consider using PAP device, if indicated post op.    Pulmonary risk stratification needs to be discussed with the physician performing the procedure and, apart from procedures involving pulmonary resection, should not be used alone to determine patient's appropriateness for the planned procedure.          Dictated utilizing Dragon dictation.    This document was electronically signed by Alexa Hartman MD on 11/24/21 at 15:21 EST

## 2021-12-08 ENCOUNTER — TELEPHONE (OUTPATIENT)
Dept: OBSTETRICS AND GYNECOLOGY | Facility: CLINIC | Age: 36
End: 2021-12-08

## 2021-12-08 RX ORDER — MEDROXYPROGESTERONE ACETATE 10 MG/1
TABLET ORAL
Qty: 22 TABLET | Refills: 0 | Status: SHIPPED | OUTPATIENT
Start: 2021-12-08 | End: 2022-01-06 | Stop reason: SDUPTHER

## 2021-12-08 NOTE — TELEPHONE ENCOUNTER
----- Message from Caitie Joseph sent at 12/8/2021  1:30 PM EST -----  Regarding: LOST MEDICATION  Patient sees Ashlyn and lost the provera prescription that she picked up at [facility]. She is requesting a new script be sent in for her.    Patient call back: [phone]  [facility] [address]

## 2022-01-06 ENCOUNTER — OFFICE VISIT (OUTPATIENT)
Dept: OBSTETRICS AND GYNECOLOGY | Facility: CLINIC | Age: 37
End: 2022-01-06

## 2022-01-06 VITALS
WEIGHT: 293 LBS | SYSTOLIC BLOOD PRESSURE: 130 MMHG | BODY MASS INDEX: 53.92 KG/M2 | DIASTOLIC BLOOD PRESSURE: 80 MMHG | HEIGHT: 62 IN

## 2022-01-06 DIAGNOSIS — Z79.899 FOLLOW-UP ENCOUNTER INVOLVING MEDICATION: Primary | ICD-10-CM

## 2022-01-06 DIAGNOSIS — Z87.42 HISTORY OF AMENORRHEA: ICD-10-CM

## 2022-01-06 PROCEDURE — 99213 OFFICE O/P EST LOW 20 MIN: CPT | Performed by: PHYSICIAN ASSISTANT

## 2022-01-06 RX ORDER — MEDROXYPROGESTERONE ACETATE 10 MG/1
TABLET ORAL
Qty: 22 TABLET | Refills: 6 | Status: SHIPPED | OUTPATIENT
Start: 2022-01-06 | End: 2022-08-02 | Stop reason: SDUPTHER

## 2022-01-06 RX ORDER — NAPROXEN 250 MG/1
250 TABLET ORAL 2 TIMES DAILY PRN
COMMUNITY

## 2022-01-06 NOTE — PROGRESS NOTES
Subjective   Chief Complaint   Patient presents with   • Follow-up     4 month follow-up on Provera.  Patient states she had one good period after starting the Provera , she's had some light spotting a few times       Lynda Biswas is a 36 y.o. year old  presenting to be seen for follow-up.  He has been using cyclic Provera for history of oligomenorrhea/amenorrhea and sporadic menorrhagia  Has been taking 20 mg of Provera monthly.  Reports her first round she had a fairly heavy bleed that lasted 9 to 10 days.  2 subsequent rounds of Provera she had lighter spotting for 5 to 6 days that occurred about a week after finishing the Provera.  Ports that she did not start the Provera back  as she did not have any further refills.  has no new complaints or concerns    Past Medical History:   Diagnosis Date   • Abdominal pain     r/t incisional hernia   • Abnormal Pap smear of cervix    • Anxiety    • Arthritis    • Bladder spasms    • Body piercing     EARS AND LEFT NIPPLE   • Depression    • Diabetes (HCC)     TAKES ORAL MED, NO INSULIN   • Dyspepsia    • Dyspnea on exertion    • Fatigue    • Fatty liver    • Gastroparesis    • GERD (gastroesophageal reflux disease)     episodic, prn Pepto, denies prior eval    • Hard of hearing     RIGHT SIDE   • Hard to intubate     Difficult intubation in 2017 with cholecystectomy.  Required prolonged ventilator support post-operatively.  Was UNABLE to be intubated at [facility] for hernia repair 2017. Procedure aborted due to difficulty of the airway.    • Hearing loss     PATIENT REPORTS VERY MILD AND THAT SHE DOES NOT USE HEARING DEVICES   • HPV (human papilloma virus) infection    • Hypertension    • Incisional hernia     r/t open melissa, needing repair   • Joint pain     mostly knees, daily Ibuprofen, no steroids   • Migraine    • Morbid obesity with BMI of 60.0-69.9, adult ([facility])    • MRSA (methicillin resistant Staphylococcus aureus)     recently, (L)  breast/abdomen, treated w/ Bactrim    • Nausea     postprandial, worse since having gallbladder removed, denies eval   • PEDRO on CPAP     compliant   • PCOS (polycystic ovarian syndrome)     unable to tolerate Metformin, on OCPs   • Peripheral edema     prn Lasix 20mg, no KCl   • PMS (premenstrual syndrome)    • Polycystic ovary syndrome    • Psoriasis    • Tattoo     X1   • Urinary tract infection    • Varicella    • Wears glasses         Current Outpatient Medications:   •  busPIRone (BUSPAR) 10 MG tablet, Take 10 mg by mouth 3 (Three) Times a Day., Disp: , Rfl:   •  carvedilol (COREG) 25 MG tablet, 25 mg., Disp: , Rfl:   •  cyclobenzaprine (FLEXERIL) 10 MG tablet, Take 10 mg by mouth 2 (Two) Times a Day As Needed for Muscle Spasms., Disp: , Rfl:   •  furosemide (LASIX) 20 MG tablet, Take 20 mg by mouth Daily As Needed., Disp: , Rfl:   •  glimepiride (AMARYL) 2 MG tablet, Take 2 mg by mouth Every Morning Before Breakfast., Disp: , Rfl:   •  lisinopril-hydrochlorothiazide (PRINZIDE,ZESTORETIC) 20-12.5 MG per tablet, Take 1 tablet by mouth Daily., Disp: , Rfl:   •  medroxyPROGESTERone (Provera) 10 MG tablet, Take 2 tablets po days 10-20 of the month, Disp: 22 tablet, Rfl: 6  •  metoclopramide (REGLAN) 5 MG tablet, TAKE 1 TABLET BY MOUTH 4 TIMES DAILY 30 MINUTES BEFORE MEALS AND BEDTIME AS NEEDED, Disp: , Rfl:   •  naproxen (NAPROSYN) 250 MG tablet, Take 250 mg by mouth 2 (Two) Times a Day As Needed., Disp: , Rfl:   •  oxybutynin XL (DITROPAN-XL) 10 MG 24 hr tablet, Take 10 mg by mouth Daily., Disp: , Rfl:   •  sertraline (ZOLOFT) 100 MG tablet, Take 100 mg by mouth Daily., Disp: , Rfl:   •  ibuprofen (ADVIL,MOTRIN) 200 MG tablet, Take 200 mg by mouth Every 6 (Six) Hours As Needed for Mild Pain ., Disp: , Rfl:    Allergies   Allergen Reactions   • Morphine Headache      Past Surgical History:   Procedure Laterality Date   • LAPAROSCOPIC APPENDECTOMY  2009   • LAPAROSCOPIC CHOLECYSTECTOMY     • [place] LAP,CHOLECYSTECTOMY  "N/A 4/17/2017    Procedure: CHOLECYSTECTOMY LAPAROSCOPIC CONVERTED TO OPEN WITH INTRAOPERATIVE CHOLEANGIOGRAM;  Surgeon: Keysha Keenan MD;  Location: [facility];  Service: General      Social History     Socioeconomic History   • Marital status: Single   Tobacco Use   • Smoking status: Former Smoker     Years: 10.00     Types: Cigarettes     Quit date: 2019     Years since quitting: 3.0   • Smokeless tobacco: Never Used   • Tobacco comment: very occassional smoker in the past   Vaping Use   • Vaping Use: Some days   • Substances: CBD, vapes 1-2x per week   Substance and Sexual Activity   • Alcohol use: Not Currently     Alcohol/week: 2.0 - 4.0 standard drinks     Types: 2 - 4 Cans of beer per week     Comment: 2XMO/15-16YRS   • Drug use: Not Currently     Types: \"Crack\" cocaine, Marijuana     Comment: last used 10 yrs ago   • Sexual activity: Not Currently     Partners: Female, Male     Birth control/protection: Condom      Family History   Problem Relation Age of Onset   • Hypertension Mother    • Kidney disease Maternal Aunt    • Obesity Father    • Hypertension Father    • Sleep apnea Brother    • Heart failure Maternal Grandmother    • Diabetes Maternal Grandmother    • Obesity Maternal Grandmother    • Diabetes Paternal Grandfather    • Obesity Paternal Grandfather        Review of Systems   Constitutional: Negative for chills, diaphoresis and fever.   Gastrointestinal: Negative.    Genitourinary: Negative for difficulty urinating and dysuria.           Objective   /80   Ht 157.5 cm (62\")   Wt (!) 167 kg (367 lb 9.6 oz)   LMP 11/15/2021 (Approximate)   Breastfeeding No   BMI 67.23 kg/m²     Physical Exam  Constitutional:       Appearance: Normal appearance. She is well-developed and well-groomed. She is morbidly obese.   Eyes:      General: Lids are normal.      Extraocular Movements: Extraocular movements intact.      Conjunctiva/sclera: Conjunctivae normal.   Skin:     General: Skin " is warm and dry.      Findings: No bruising or lesion.   Neurological:      Mental Status: She is alert.   Psychiatric:         Attention and Perception: Attention normal.         Mood and Affect: Mood normal.         Speech: Speech normal.         Behavior: Behavior is cooperative.            Result Review :                   Assessment and Plan  Diagnoses and all orders for this visit:    1. Follow-up encounter involving medication (Primary)    2. History of amenorrhea    Other orders  -     medroxyPROGESTERone (Provera) 10 MG tablet; Take 2 tablets po days 10-20 of the month  Dispense: 22 tablet; Refill: 6      Patient Instructions   We will continue monthly cyclic Provera at 20 mg for 10 days monthly.  As patient is 6 days late starting her cycle of Provera in January we will switch her days to cycle with Provera 10th through the 20th of each month.  Follow-up in 6 months or as needed.             This note was electronically signed.    Stella Sánchez PA-C   January 6, 2022

## 2022-01-06 NOTE — PATIENT INSTRUCTIONS
We will continue monthly cyclic Provera at 20 mg for 10 days monthly.  As patient is 6 days late starting her cycle of Provera in January we will switch her days to cycle with Provera 10th through the 20th of each month.  Follow-up in 6 months or as needed.

## 2022-02-08 DIAGNOSIS — R10.13 DYSPEPSIA: Primary | ICD-10-CM

## 2022-02-08 DIAGNOSIS — R10.13 DYSPEPSIA: ICD-10-CM

## 2022-04-11 RX ORDER — AMOXICILLIN 500 MG/1
1000 CAPSULE ORAL 2 TIMES DAILY
Qty: 56 CAPSULE | Refills: 0 | Status: SHIPPED | OUTPATIENT
Start: 2022-04-11 | End: 2022-04-25

## 2022-04-11 RX ORDER — CLARITHROMYCIN 500 MG/1
500 TABLET, COATED ORAL 2 TIMES DAILY
Qty: 28 TABLET | Refills: 0 | Status: SHIPPED | OUTPATIENT
Start: 2022-04-11 | End: 2022-04-25

## 2022-04-11 RX ORDER — OMEPRAZOLE 20 MG/1
20 CAPSULE, DELAYED RELEASE ORAL 2 TIMES DAILY
Qty: 28 CAPSULE | Refills: 0 | Status: SHIPPED | OUTPATIENT
Start: 2022-04-11 | End: 2022-04-25

## 2022-05-18 ENCOUNTER — TELEPHONE (OUTPATIENT)
Dept: BARIATRICS/WEIGHT MGMT | Facility: CLINIC | Age: 37
End: 2022-05-18

## 2022-05-18 NOTE — TELEPHONE ENCOUNTER
Bariatric Nutrition Consult     Name: Lynda Biswas   : 1985   AGE: 36 y.o.   MRN: [id number]      Consult Date: phone consult 22    Surgery desired: sleeve    Height: 157.5cm                  Current weight: 367lbs   On 21                 BMI: 67    Highest weight: current                           Lowest weight: unknown    Goals: under 200lbs, to lose weight to have a hernia repair        Past Medical History:   Diagnosis Date   • Abdominal pain     r/t incisional hernia   • Abnormal Pap smear of cervix    • Anxiety    • Arthritis    • Bladder spasms    • Body piercing     EARS AND LEFT NIPPLE   • Depression    • Diabetes (HCC)     TAKES ORAL MED, NO INSULIN   • Dyspepsia    • Dyspnea on exertion    • Fatigue    • Fatty liver    • Gastroparesis    • GERD (gastroesophageal reflux disease)     episodic, prn Pepto, denies prior eval    • Hard of hearing     RIGHT SIDE   • Hard to intubate     Difficult intubation in 2017 with cholecystectomy.  Required prolonged ventilator support post-operatively.  Was UNABLE to be intubated at [facility] for hernia repair 2017. Procedure aborted due to difficulty of the airway.    • Hearing loss     PATIENT REPORTS VERY MILD AND THAT SHE DOES NOT USE HEARING DEVICES   • HPV (human papilloma virus) infection    • Hypertension    • Incisional hernia     r/t open melissa, needing repair   • Joint pain     mostly knees, daily Ibuprofen, no steroids   • Migraine    • Morbid obesity with BMI of 60.0-69.9, adult (HCC)    • MRSA (methicillin resistant Staphylococcus aureus)     recently, (L) breast/abdomen, treated w/ Bactrim    • Nausea     postprandial, worse since having gallbladder removed, denies eval   • PEDRO on CPAP     compliant   • PCOS (polycystic ovarian syndrome)     unable to tolerate Metformin, on OCPs   • Peripheral edema     prn Lasix 20mg, no KCl   • PMS (premenstrual syndrome)    • Polycystic ovary syndrome    • Psoriasis    • Tattoo     X1   • Urinary  tract infection    • Varicella    • Wears glasses                                  Diet history reveals 2 meals and snacks daily. Patient admits to binging which, to her, is eating a large portion of food and continuing to eat more despite feeling full and overfull. She does not purge.  Forgets to eat often til 2/3p and then chooses fast food (burger or fried chicken and fries or subway footlong meatball sub or $5 bucket from [employer]). Dinner: mac n cheese or veg or turkey, cheese and sher sandwich with mac n cheese/chips  Snacks: cookies  Diet is irregular, unbalanced and high in both fat and carbs      Drinks: water with sugar free flavoring, gatorade zero with protein, smoothie with protein powder and water and greens and fruit, andria tea, root beer and coke    Food allergies/intolerances: none but avoids pork    Night eating: no    Patient has/has not been diagnosed with an eating disorder: no but she reports binging    Exercise/activity: no    Main bariatric nutrition principles discussed and explained. Patient needs to focus on 100g protein daily, 100-140g carbohydrates daily, healthy fat intake, 64 oz fluid daily, no carbonation, and try protein drinks/protein powders. Patient verbalized understanding and queries were answered. Encouraged pt to continue therapy and discuss binging with the medical team and her therapist  Additional nutritional counseling will be available      Chica Carroll RD,LD

## 2022-06-08 DIAGNOSIS — Z01.818 PREOPERATIVE TESTING: Primary | ICD-10-CM

## 2022-06-08 DIAGNOSIS — E66.01 MORBID OBESITY WITH BMI OF 60.0-69.9, ADULT: ICD-10-CM

## 2022-06-17 ENCOUNTER — LAB (OUTPATIENT)
Dept: LAB | Facility: HOSPITAL | Age: 37
End: 2022-06-17

## 2022-06-17 DIAGNOSIS — Z01.818 PREOPERATIVE TESTING: ICD-10-CM

## 2022-06-17 DIAGNOSIS — E66.01 MORBID OBESITY WITH BMI OF 60.0-69.9, ADULT: ICD-10-CM

## 2022-06-17 LAB
AMPHET+METHAMPHET UR QL: NEGATIVE
AMPHETAMINES UR QL: NEGATIVE
BARBITURATES UR QL SCN: NEGATIVE
BENZODIAZ UR QL SCN: NEGATIVE
BUPRENORPHINE SERPL-MCNC: NEGATIVE NG/ML
CANNABINOIDS SERPL QL: NEGATIVE
COCAINE UR QL: NEGATIVE
METHADONE UR QL SCN: NEGATIVE
OPIATES UR QL: NEGATIVE
OXYCODONE UR QL SCN: NEGATIVE
PCP UR QL SCN: NEGATIVE
PROPOXYPH UR QL: NEGATIVE
TRICYCLICS UR QL SCN: NEGATIVE

## 2022-06-17 PROCEDURE — 80305 DRUG TEST PRSMV DIR OPT OBS: CPT

## 2022-06-17 PROCEDURE — 80306 DRUG TEST PRSMV INSTRMNT: CPT

## 2022-06-18 LAB
COTININE UR QL SCN: NEGATIVE NG/ML
Lab: NORMAL

## 2022-07-06 ENCOUNTER — OFFICE VISIT (OUTPATIENT)
Dept: OBSTETRICS AND GYNECOLOGY | Facility: CLINIC | Age: 37
End: 2022-07-06

## 2022-07-06 VITALS
DIASTOLIC BLOOD PRESSURE: 76 MMHG | SYSTOLIC BLOOD PRESSURE: 118 MMHG | BODY MASS INDEX: 53.92 KG/M2 | WEIGHT: 293 LBS | HEIGHT: 62 IN

## 2022-07-06 DIAGNOSIS — R10.2 PELVIC PAIN: ICD-10-CM

## 2022-07-06 DIAGNOSIS — E28.2 POLYCYSTIC OVARY SYNDROME: ICD-10-CM

## 2022-07-06 DIAGNOSIS — N91.2 AMENORRHEA: Primary | ICD-10-CM

## 2022-07-06 LAB
BILIRUB BLD-MCNC: NEGATIVE MG/DL
CLARITY, POC: CLEAR
COLOR UR: ABNORMAL
EXPIRATION DATE: ABNORMAL
GLUCOSE UR STRIP-MCNC: NEGATIVE MG/DL
KETONES UR QL: ABNORMAL
LEUKOCYTE EST, POC: NEGATIVE
Lab: ABNORMAL
NITRITE UR-MCNC: NEGATIVE MG/ML
PH UR: 6 [PH] (ref 5–8)
PROT UR STRIP-MCNC: NEGATIVE MG/DL
RBC # UR STRIP: NEGATIVE /UL
SP GR UR: 1.01 (ref 1–1.03)
UROBILINOGEN UR QL: NORMAL

## 2022-07-06 PROCEDURE — 99214 OFFICE O/P EST MOD 30 MIN: CPT | Performed by: PHYSICIAN ASSISTANT

## 2022-07-06 NOTE — PROGRESS NOTES
Subjective   Chief Complaint   Patient presents with   • Follow-up     6 month follow-up on Provera, patient states she still isn't having a period with Provera       Lynda Biswas is a 36 y.o. year old  presenting to be seen for follow-up Provera and amenorrhea.  Patient with history of polycystic ovary syndrome and amenorrhea/oligomenorrhea.  She has been using Provera cyclically to induce withdrawal bleeds.  She had previously had withdrawal bleeds after Provera 20 mg which she takes days 10 through 20 of the month.  However she reports that she has not had a withdrawal bleed for the last 6 or 7 months.  She is feeling some pressure in the lower pelvis and she especially feels pressure and pain when she has the urge to urinate.    No dysuria and UA is negative in office today.  She did have a pelvic ultrasound done in  however ovaries were not visualized on the ultrasound.  No sexual activity for quite some time    Past Medical History:   Diagnosis Date   • Abdominal pain     r/t incisional hernia   • Abnormal Pap smear of cervix    • Anxiety    • Arthritis    • Bladder spasms    • Body piercing     EARS AND LEFT NIPPLE   • Depression    • Diabetes (HCC)     TAKES ORAL MED, NO INSULIN   • Dyspepsia    • Dyspnea on exertion    • Fatigue    • Fatty liver    • Gastroparesis    • GERD (gastroesophageal reflux disease)     episodic, prn Pepto, denies prior eval    • Hard of hearing     RIGHT SIDE   • Hard to intubate     Difficult intubation in 2017 with cholecystectomy.  Required prolonged ventilator support post-operatively.  Was UNABLE to be intubated at [facility] for hernia repair 2017. Procedure aborted due to difficulty of the airway.    • Hearing loss     PATIENT REPORTS VERY MILD AND THAT SHE DOES NOT USE HEARING DEVICES   • HPV (human papilloma virus) infection    • Hypertension    • Incisional hernia     r/t open melissa, needing repair   • Joint pain     mostly knees, daily Ibuprofen, no  steroids   • Migraine    • Morbid obesity with BMI of 60.0-69.9, adult (HCC)    • MRSA (methicillin resistant Staphylococcus aureus)     recently, (L) breast/abdomen, treated w/ Bactrim    • Nausea     postprandial, worse since having gallbladder removed, denies eval   • PEDRO on CPAP     compliant   • PCOS (polycystic ovarian syndrome)     unable to tolerate Metformin, on OCPs   • Peripheral edema     prn Lasix 20mg, no KCl   • PMS (premenstrual syndrome)    • Polycystic ovary syndrome    • Psoriasis    • Tattoo     X1   • Urinary tract infection    • Varicella    • Wears glasses         Current Outpatient Medications:   •  busPIRone (BUSPAR) 10 MG tablet, Take 10 mg by mouth 3 (Three) Times a Day., Disp: , Rfl:   •  carvedilol (COREG) 25 MG tablet, 25 mg., Disp: , Rfl:   •  cyclobenzaprine (FLEXERIL) 10 MG tablet, Take 10 mg by mouth 2 (Two) Times a Day As Needed for Muscle Spasms., Disp: , Rfl:   •  glimepiride (AMARYL) 2 MG tablet, Take 2 mg by mouth Every Morning Before Breakfast., Disp: , Rfl:   •  ibuprofen (ADVIL,MOTRIN) 200 MG tablet, Take 200 mg by mouth Every 6 (Six) Hours As Needed for Mild Pain ., Disp: , Rfl:   •  lisinopril-hydrochlorothiazide (PRINZIDE,ZESTORETIC) 20-12.5 MG per tablet, Take 1 tablet by mouth Daily., Disp: , Rfl:   •  medroxyPROGESTERone (Provera) 10 MG tablet, Take 2 tablets po days 10-20 of the month, Disp: 22 tablet, Rfl: 6  •  metoclopramide (REGLAN) 5 MG tablet, TAKE 1 TABLET BY MOUTH 4 TIMES DAILY 30 MINUTES BEFORE MEALS AND BEDTIME AS NEEDED, Disp: , Rfl:   •  naproxen (NAPROSYN) 250 MG tablet, Take 250 mg by mouth 2 (Two) Times a Day As Needed., Disp: , Rfl:   •  oxybutynin XL (DITROPAN-XL) 10 MG 24 hr tablet, Take 10 mg by mouth Daily., Disp: , Rfl:   •  sertraline (ZOLOFT) 100 MG tablet, Take 100 mg by mouth Daily., Disp: , Rfl:    Allergies   Allergen Reactions   • Morphine Headache      Past Surgical History:   Procedure Laterality Date   • LAPAROSCOPIC APPENDECTOMY  2009  "  • LAPAROSCOPIC CHOLECYSTECTOMY     • [place] LAP,CHOLECYSTECTOMY N/A 4/17/2017    Procedure: CHOLECYSTECTOMY LAPAROSCOPIC CONVERTED TO OPEN WITH INTRAOPERATIVE CHOLEANGIOGRAM;  Surgeon: Keysha Keenan MD;  Location: [facility];  Service: General      Social History     Socioeconomic History   • Marital status: Single   Tobacco Use   • Smoking status: Former Smoker     Years: 10.00     Types: Cigarettes     Quit date: 2019     Years since quitting: 3.5   • Smokeless tobacco: Never Used   • Tobacco comment: very occassional smoker in the past   Vaping Use   • Vaping Use: Some days   • Substances: CBD, vapes 1-2x per week   Substance and Sexual Activity   • Alcohol use: Not Currently     Alcohol/week: 2.0 - 4.0 standard drinks     Types: 2 - 4 Cans of beer per week     Comment: 2XMO/15-16YRS   • Drug use: Not Currently     Types: \"Crack\" cocaine, Marijuana     Comment: last used 10 yrs ago   • Sexual activity: Not Currently     Partners: Female, Male     Birth control/protection: Condom      Family History   Problem Relation Age of Onset   • Hypertension Mother    • Kidney disease Maternal Aunt    • Obesity Father    • Hypertension Father    • Sleep apnea Brother    • Heart failure Maternal Grandmother    • Diabetes Maternal Grandmother    • Obesity Maternal Grandmother    • Diabetes Paternal Grandfather    • Obesity Paternal Grandfather        Review of Systems   Constitutional: Negative for chills, diaphoresis and fever.   Gastrointestinal: Negative.    Genitourinary: Positive for menstrual problem and pelvic pain. Negative for difficulty urinating, dysuria, vaginal bleeding and vaginal discharge.           Objective   /76   Ht 157.5 cm (62\")   Wt (!) 167 kg (367 lb 9.6 oz)   Breastfeeding No   BMI 67.23 kg/m²     Physical Exam  Constitutional:       Appearance: Normal appearance. She is well-developed and well-groomed. She is morbidly obese.   Eyes:      General: Lids are normal.      Extraocular " Movements: Extraocular movements intact.      Conjunctiva/sclera: Conjunctivae normal.   Skin:     General: Skin is warm and dry.      Findings: No bruising or lesion.   Neurological:      Mental Status: She is alert.   Psychiatric:         Attention and Perception: Attention normal.         Mood and Affect: Mood normal.         Speech: Speech normal.         Behavior: Behavior is cooperative.            Result Review :   The following data was reviewed by: Stella Sánchez PA-C on 07/06/2022:    Data reviewed: pelvic ultrasound 2021            Assessment and Plan  Diagnoses and all orders for this visit:    1. Amenorrhea (Primary)  -     Testosterone (Free & Total), LC / MS  -     17-Hydroxyprogesterone  -     DHEA-Sulfate  -     Estradiol  -     Follicle Stimulating Hormone  -     Luteinizing Hormone  -     Progesterone  -     Prolactin  -     HCG, B-subunit, Quantitative ([facility] Only)  -     TSH Rfx On Abnormal To Free T4  -     US Non-ob Transvaginal    2. Pelvic pain  -     US Non-ob Transvaginal  -     POC Urinalysis Dipstick, Automated    3. Polycystic ovary syndrome      Patient Instructions   Will check labs today  RTO for pelvic ultrasound 2 weeks  Continue cyclic provera for now              This note was electronically signed.    Stella Sánchez PA-C   July 6, 2022

## 2022-07-12 LAB
17OHP SERPL-MCNC: 10 NG/DL
DHEA-S SERPL-MCNC: 165 UG/DL (ref 57.3–279.2)
ESTRADIOL SERPL-MCNC: 52.5 PG/ML
FSH SERPL-ACNC: 5.6 MIU/ML
HCG INTACT+B SERPL-ACNC: <1 MIU/ML
LH SERPL-ACNC: 4.7 MIU/ML
PROGEST SERPL-MCNC: 0.1 NG/ML
PROLACTIN SERPL-MCNC: 10.2 NG/ML (ref 4.8–23.3)
TESTOST FREE SERPL-MCNC: 6 PG/ML (ref 0–4.2)
TESTOST SERPL-MCNC: 18.9 NG/DL (ref 10–55)
TSH SERPL DL<=0.005 MIU/L-ACNC: 1.27 UIU/ML (ref 0.27–4.2)

## 2022-07-20 ENCOUNTER — DOCUMENTATION (OUTPATIENT)
Dept: OBSTETRICS AND GYNECOLOGY | Facility: CLINIC | Age: 37
End: 2022-07-20

## 2022-07-20 ENCOUNTER — TELEPHONE (OUTPATIENT)
Dept: OBSTETRICS AND GYNECOLOGY | Facility: CLINIC | Age: 37
End: 2022-07-20

## 2022-07-20 NOTE — TELEPHONE ENCOUNTER
Patient called, states she missed your called and was wanting to know if you could call her again.      Thanks,    Marifer

## 2022-07-20 NOTE — PROGRESS NOTES
Patient is informed of results of her pelvic ultrasound done earlier this morning.  Uterus is normal appearing.  Endometrium is 7 mm.  Bilateral ovaries appear normal with vascular flow no free fluid or adnexal masses or cysts noted.  Patient will start taking her Provera for 10 days every other month on days 1-10 of months, instead of taking the Provera monthly.  She is advised to follow-up in 6 months or as needed.

## 2022-08-02 ENCOUNTER — TELEPHONE (OUTPATIENT)
Dept: OBSTETRICS AND GYNECOLOGY | Facility: CLINIC | Age: 37
End: 2022-08-02

## 2022-08-02 RX ORDER — MEDROXYPROGESTERONE ACETATE 10 MG/1
TABLET ORAL
Qty: 20 TABLET | Refills: 6 | Status: SHIPPED | OUTPATIENT
Start: 2022-08-02

## 2022-08-02 NOTE — TELEPHONE ENCOUNTER
----- Message from Rosa Terry sent at 8/2/2022  9:12 AM EDT -----  Pt requested a refill for: Provera    Ashlyn's pt    [facility]

## 2022-08-31 ENCOUNTER — OFFICE VISIT (OUTPATIENT)
Dept: BEHAVIORAL HEALTH | Facility: CLINIC | Age: 37
End: 2022-08-31

## 2022-08-31 ENCOUNTER — OFFICE VISIT (OUTPATIENT)
Dept: BARIATRICS/WEIGHT MGMT | Facility: CLINIC | Age: 37
End: 2022-08-31

## 2022-08-31 ENCOUNTER — DOCUMENTATION (OUTPATIENT)
Dept: BARIATRICS/WEIGHT MGMT | Facility: CLINIC | Age: 37
End: 2022-08-31

## 2022-08-31 VITALS
RESPIRATION RATE: 18 BRPM | DIASTOLIC BLOOD PRESSURE: 90 MMHG | OXYGEN SATURATION: 97 % | BODY MASS INDEX: 53.92 KG/M2 | SYSTOLIC BLOOD PRESSURE: 148 MMHG | HEIGHT: 62 IN | TEMPERATURE: 98 F | WEIGHT: 293 LBS | HEART RATE: 101 BPM

## 2022-08-31 DIAGNOSIS — F32.A ANXIETY AND DEPRESSION: ICD-10-CM

## 2022-08-31 DIAGNOSIS — F41.9 ANXIETY AND DEPRESSION: ICD-10-CM

## 2022-08-31 DIAGNOSIS — Z71.89 ENCOUNTER FOR PSYCHOLOGICAL ASSESSMENT PRIOR TO BARIATRIC SURGERY: ICD-10-CM

## 2022-08-31 DIAGNOSIS — E66.01 MORBID OBESITY: Primary | ICD-10-CM

## 2022-08-31 DIAGNOSIS — R53.83 FATIGUE, UNSPECIFIED TYPE: ICD-10-CM

## 2022-08-31 DIAGNOSIS — E66.01 MORBID OBESITY WITH BODY MASS INDEX (BMI) OF 60.0 TO 69.9 IN ADULT: Primary | ICD-10-CM

## 2022-08-31 DIAGNOSIS — R06.09 DYSPNEA ON EXERTION: Primary | ICD-10-CM

## 2022-08-31 PROCEDURE — 90791 PSYCH DIAGNOSTIC EVALUATION: CPT | Performed by: PSYCHOLOGIST

## 2022-08-31 PROCEDURE — 99214 OFFICE O/P EST MOD 30 MIN: CPT | Performed by: PHYSICIAN ASSISTANT

## 2022-08-31 NOTE — PROGRESS NOTES
"[facility] BARIATRIC SURGERY  [address]  [address]  [facility] [id number] [phone]      Patient  Name:  Lynda Biswas  :  1985      Date of Visit: 2022      Chief Complaint:  weight gain; unable to maintain weight loss      History of Present Illness:  Lynda Biswas is a 36 y.o. female who presents today for evaluation, education and consultation regarding metabolic and bariatric surgery with Dr. Fonseca.       Per LOV 21 with Dr. Vasquez:     \"From initial intake in 2020 by Adelina Santo:     \"Referred by:  Dr. Keysha Keenan.  hx lap converted to open melissa 2017, complicated by difficult intubation w/ postop hypoxia requiring prolonged ventilation in ICU.  Now w/ incisional hernia, s/p failed attempted repair 2017 d/t inability to intubate - was told she needs to lose weight prior to future attempts.       Noncontrast CT abd 20 @R reveals large ventral hernia contains nonobstructed portions of the right hemicolon and small bowel.  Presents for in office eval today.\"     Plan was to defer EGD due to intubation issues and do UGI instead.     21 Update:     She has episodic heartburn.  Much better since gallbladder removed.       12/10/20 GES reviewed:  IMPRESSION:  Decreased gastric emptying from expected concerning for  delayed gastric emptying or gastroparesis.     Her PCP NICHOLAS De La Rosa put her on Reglan.  This has helped her symptoms of constipation immensely.  Also less bloating since starting Reglan.     She is working with PCP for supervised medical weight loss.     Former smoker.  Vapes CBD 1-2x per week to mitigate anxiety symptoms.     Dr. Loida Keenan had attempted lap melissa, but it was converted to open as pt did not tolerate CO2 insufflation.  She now has a large ventral incisional hernia at the [place] incision.  Bariatric surgery has been recommended before repair.\"      Today's Update:       Lynda has been " overweight for at least 30 years, has been 35 pounds or more overweight for at least 25 years, has been 100 pounds or more overweight for 15 or more years and started dieting at age 19.  Previous diet attempts include: High Protein, Low Carbohydrate, Low Fat, Calorie Counting and Slim Fast; None; Prozac.   Her maximum lifetime weight is 366 pounds.      Patient has not had any major medical changes since she was last seen in the office.  She has been completing her surgical checklist as initially outlined in her original intake in 2020.  She continues to struggle with weight gain.  She does have a large ventral hernia, but denies any pain/discomfort from this.  She denies any fever, cough, shortness of breath, chest pain, abdominal pain.     UGI 8/27/21:     FINDINGS: The esophagus is normal in caliber with no ulcer, stricture or  mass. There is no hiatal hernia. There is gastroesophageal reflux to the  midesophagus. Peristalsis is normal. The rugal fold pattern of the  stomach is normal. The duodenal bulb is normal.  She was found to have delayed gastric emptying and Reglan has helped with her symptoms.     IMPRESSION:  Gastroesophageal reflux to the midesophagus.    Pulmonary Clearance 11/24/21 Dr. Hartman: low risk     Cardiac Clearance 11/17/21 Dr. Lena ROONEY: moderate risk     H pylori breath test 3/2022: negative     GES 12/10/20:     IMPRESSION:  Decreased gastric emptying from expected concerning for  delayed gastric emptying or gastroparesis.     Complete history has been obtained and discussed today, as pertinent to metabolic/ bariatric surgery.     Past Medical History:   Diagnosis Date   • Abdominal pain     r/t incisional hernia   • Abnormal Pap smear of cervix    • Anxiety    • Arthritis    • Bladder spasms    • Body piercing     EARS AND LEFT NIPPLE   • Depression    • Diabetes (HCC)     TAKES ORAL MED, NO INSULIN   • Dyspepsia    • Dyspnea on exertion    • Fatigue    • Fatty liver    • Gastroparesis     • GERD (gastroesophageal reflux disease)     episodic, prn Pepto, denies prior eval    • Hard of hearing     RIGHT SIDE   • Hard to intubate     Difficult intubation in 4/2017 with cholecystectomy.  Required prolonged ventilator support post-operatively.  Was UNABLE to be intubated at [facility] for hernia repair 9/2017. Procedure aborted due to difficulty of the airway.    • Hearing loss     PATIENT REPORTS VERY MILD AND THAT SHE DOES NOT USE HEARING DEVICES   • HPV (human papilloma virus) infection    • Hypertension    • Incisional hernia     r/t open melissa, needing repair   • Joint pain     mostly knees, daily Ibuprofen, no steroids   • Migraine    • Morbid obesity with BMI of 60.0-69.9, adult (HCC)    • MRSA (methicillin resistant Staphylococcus aureus)     recently, (L) breast/abdomen, treated w/ Bactrim    • Nausea     postprandial, worse since having gallbladder removed, denies eval   • PEDRO on CPAP     compliant   • PCOS (polycystic ovarian syndrome)     unable to tolerate Metformin, on OCPs   • Peripheral edema     prn Lasix 20mg, no KCl   • PMS (premenstrual syndrome)    • Polycystic ovary syndrome    • Psoriasis    • Tattoo     X1   • Urinary tract infection    • Varicella    • Wears glasses      Past Surgical History:   Procedure Laterality Date   • LAPAROSCOPIC APPENDECTOMY  2009   • LAPAROSCOPIC CHOLECYSTECTOMY     • [place] LAP,CHOLECYSTECTOMY N/A 4/17/2017    Procedure: CHOLECYSTECTOMY LAPAROSCOPIC CONVERTED TO OPEN WITH INTRAOPERATIVE CHOLEANGIOGRAM;  Surgeon: Keysha Keenan MD;  Location: [facility];  Service: General       Allergies   Allergen Reactions   • Morphine Headache       Current Outpatient Medications:   •  busPIRone (BUSPAR) 10 MG tablet, Take 10 mg by mouth 3 (Three) Times a Day., Disp: , Rfl:   •  carvedilol (COREG) 25 MG tablet, 25 mg., Disp: , Rfl:   •  glimepiride (AMARYL) 2 MG tablet, Take 2 mg by mouth Every Morning Before Breakfast., Disp: , Rfl:   •   "lisinopril-hydrochlorothiazide (PRINZIDE,ZESTORETIC) 20-12.5 MG per tablet, Take 1 tablet by mouth Daily., Disp: , Rfl:   •  medroxyPROGESTERone (Provera) 10 MG tablet, Take 2 tablets po days 1-10 of every other month, Disp: 20 tablet, Rfl: 6  •  metoclopramide (REGLAN) 5 MG tablet, TAKE 1 TABLET BY MOUTH 4 TIMES DAILY 30 MINUTES BEFORE MEALS AND BEDTIME AS NEEDED, Disp: , Rfl:   •  oxybutynin XL (DITROPAN-XL) 10 MG 24 hr tablet, Take 10 mg by mouth Daily., Disp: , Rfl:   •  sertraline (ZOLOFT) 100 MG tablet, Take 100 mg by mouth Daily., Disp: , Rfl:   •  cyclobenzaprine (FLEXERIL) 10 MG tablet, Take 10 mg by mouth 2 (Two) Times a Day As Needed for Muscle Spasms., Disp: , Rfl:   •  ibuprofen (ADVIL,MOTRIN) 200 MG tablet, Take 200 mg by mouth Every 6 (Six) Hours As Needed for Mild Pain ., Disp: , Rfl:   •  naproxen (NAPROSYN) 250 MG tablet, Take 250 mg by mouth 2 (Two) Times a Day As Needed., Disp: , Rfl:     Social History     Socioeconomic History   • Marital status: Single   Tobacco Use   • Smoking status: Former Smoker     Years: 10.00     Types: Cigarettes     Quit date: 2019     Years since quitting: 3.6   • Smokeless tobacco: Never Used   • Tobacco comment: very occassional smoker in the past   Vaping Use   • Vaping Use: Some days   • Substances: CBD, vapes 1-2x per week   Substance and Sexual Activity   • Alcohol use: Not Currently     Alcohol/week: 2.0 - 4.0 standard drinks     Types: 2 - 4 Cans of beer per week     Comment: 2XMO/15-16YRS   • Drug use: Not Currently     Types: \"Crack\" cocaine, Marijuana     Comment: last used 10 yrs ago   • Sexual activity: Not Currently     Partners: Female, Male     Birth control/protection: Condom     Social History     Social History Narrative    Lives in [place], [place] - cares for her grandfather - formerly worked at [employer].       Family History   Problem Relation Age of Onset   • Hypertension Mother    • Kidney disease Maternal Aunt    • Obesity Father    • Hypertension " Father    • Sleep apnea Brother    • Heart failure Maternal Grandmother    • Diabetes Maternal Grandmother    • Obesity Maternal Grandmother    • Diabetes Paternal Grandfather    • Obesity Paternal Grandfather        Review of Systems:  Constitutional:  reports fatigue, weight gain and denies fevers, chills.  HEENT:  denies headache, ear pain or loss of hearing, blurred or double vision, nasal discharge or sore throat.  Cardiovascular:  reports HTN and denies HLD, CAD, Atrial Fib, hx heart disease, heart murmur, hx MI, chest pain, palpitations, edema, hx DVT.  Respiratory:  reports dyspnea on exertion, sleep apnea and denies shortness of breath , cough , wheezing, asthma, COPD, hx PE.  Gastrointestinal:  reports heartburn, nausea, abdominal pain and denies dysphagia, vomiting, IBS.  Genitourinary:  reports none and denies history of  frequent UTI, incontinence, hematuria, dysuria, polyuria, polydipsia, renal insufficiency, renal failure.    Musculoskeletal:  reports joint pain, arthritis and denies fibromyalgia and autoimmune disease.  Neurological:  reports none and denies headaches, migraines, numbness /tingling, dizziness, confusion, seizure, stroke.  Psychiatric:  reports hx depression, hx anxiety and denies bipolar disorder, suicidal ideation, hx suicide attempt, hx self injury, hx substance abuse, eating disorder.  Endocrine:  reports glucose intolerance, PCOS, diabetes and denies endometriosis, thyroid disease, gout.  Hematologic:  reports none and denies bruising, bleeding disorder, hx anemia, hx blood transfusion.  Skin:  reports hx MRSA and denies rashes.    Physical Exam:  Vital Signs:  Weight: (!) 162 kg (357 lb 8 oz)   Body mass index is 65.39 kg/m².  Temp: 98 °F (36.7 °C)   Heart Rate: 101   BP: 148/90     Physical Exam  Constitutional:       Appearance: She is obese.   HENT:      Head: Normocephalic and atraumatic.   Cardiovascular:      Rate and Rhythm: Normal rate and regular rhythm.   Pulmonary:       Effort: Pulmonary effort is normal.      Breath sounds: Normal breath sounds.   Abdominal:      General: Bowel sounds are normal. There is no distension.      Palpations: Abdomen is soft. There is no mass.      Tenderness: There is no abdominal tenderness.      Comments: Large R ventral/incisional hernia along large RUQ incision from melissa    Skin:     General: Skin is warm and dry.   Neurological:      General: No focal deficit present.      Mental Status: She is alert and oriented to person, place, and time.   Psychiatric:         Mood and Affect: Mood normal.         Behavior: Behavior normal.         Patient Active Problem List   Diagnosis   • MRSA (methicillin resistant Staphylococcus aureus) infection   • Incisional hernia, without obstruction or gangrene   • Morbid obesity with BMI of 60.0-69.9, adult (HCC)   • Difficult airway for intubation   • Failed intubation during puerperium   • Joint pain   • Hypertension   • Peripheral edema   • PCOS (polycystic ovarian syndrome)   • Anxiety   • Depression   • PEDRO on CPAP   • Nausea   • MRSA (methicillin resistant Staphylococcus aureus)   • GERD (gastroesophageal reflux disease)   • Diabetes (HCC)   • Fatty liver   • Bladder spasms   • Fatigue   • Abdominal pain   • Dyspepsia   • Dyspnea on exertion       Assessment:  36 y.o. female with medically complicated obesity pursuing sleeve gastrectomy.    Metabolic & Bariatric Surgery is deemed medically necessary given the following: Class 3 Severe Obesity (BMI >=40). Obesity-related health conditions include the following: as above. Obesity is worsening. BMI is is above average; BMI management plan is completed. We discussed consulting a Bariatric surgeon.        Plan:  Further evaluation will include: CBC, CMP, Lipids, TSH, HgA1C, H.Pylori UBT, EKG, CXR, Gastric Emptying Study and UGI.    Additional clearances needed prior to surgery will include: Cardiology and Pulmonary.     Patient understands that bariatric surgery  is not cosmetic surgery but rather a tool to help make a lifelong commitment to lifestyle changes including diet, exercise and behavior modifications.  The patient has been educated today on those expected postoperative lifestyle changes.  Psychological and Nutritional consultations will be completed prior to surgery.  Instructions on how to access [employer] (an internet based site w/ educational surgical videos) were given to the patient.  Recommended perioperative vitamin supplementation was reviewed.  The importance of avoiding ASA/ NSAIDS/ steroids/ tobacco/nicotine/ hormones/ immunomodulators perioperatively was discussed in detail.  All questions/concerns have been addressed.      Further input to follow pending the above.           NICHOLAS Dallas        ADDENDUM:     PFTs abnormal. Will add pulmonary clearance

## 2022-08-31 NOTE — PROGRESS NOTES
Bariatric Nutrition Consult     Name: Lynda Biswas   : 1985   AGE: 36 y.o.   MRN: [id number]      Consult Date: 2022     Surgery desired: sleeve    Height: 157.5cm                 Current weight: 357lbs                    BMI: 65.39    Highest weight: 366lbs                           Lowest weight: unknown    Goals: under 200lbs, to travel and go back to school        Past Medical History:   Diagnosis Date   • Abdominal pain     r/t incisional hernia   • Abnormal Pap smear of cervix    • Anxiety    • Arthritis    • Bladder spasms    • Body piercing     EARS AND LEFT NIPPLE   • Depression    • Diabetes (HCC)     TAKES ORAL MED, NO INSULIN   • Dyspepsia    • Dyspnea on exertion    • Fatigue    • Fatty liver    • Gastroparesis    • GERD (gastroesophageal reflux disease)     episodic, prn Pepto, denies prior eval    • Hard of hearing     RIGHT SIDE   • Hard to intubate     Difficult intubation in 2017 with cholecystectomy.  Required prolonged ventilator support post-operatively.  Was UNABLE to be intubated at [facility] for hernia repair 2017. Procedure aborted due to difficulty of the airway.    • Hearing loss     PATIENT REPORTS VERY MILD AND THAT SHE DOES NOT USE HEARING DEVICES   • HPV (human papilloma virus) infection    • Hypertension    • Incisional hernia     r/t open melissa, needing repair   • Joint pain     mostly knees, daily Ibuprofen, no steroids   • Migraine    • Morbid obesity with BMI of 60.0-69.9, adult (HCC)    • MRSA (methicillin resistant Staphylococcus aureus)     recently, (L) breast/abdomen, treated w/ Bactrim    • Nausea     postprandial, worse since having gallbladder removed, denies eval   • PEDRO on CPAP     compliant   • PCOS (polycystic ovarian syndrome)     unable to tolerate Metformin, on OCPs   • Peripheral edema     prn Lasix 20mg, no KCl   • PMS (premenstrual syndrome)    • Polycystic ovary syndrome    • Psoriasis    • Tattoo     X1   • Urinary tract infection    •  Varicella    • Wears glasses                                  Diet history reveals eats leftovers/foods others don't eat at home usually. When she cooks she feels she wastes food as doesn't eat it in time. Forgets to eat/busy during the day, recognizes this makes her eat larger portions later in the day. Starts eating 1-3p, lunch 5p, dinner 10p, bed 11p-2a depending on the day    24 hr recall:  3p sharif hen-one hen, 1 cup baked beans  5p: 2 cups cheese puffs  8p: 20 mini mint patties  10p: 2 cups cheese puffs, 2 slices beef pizza  6a: steak and cheese biscuit      Drinks: water, diet mt dew, gatorade    Food allergies/intolerances: none    Night eating: no    Patient has/has not been diagnosed with an eating disorder: no but pt reports she feels she binges due to not eating for hours     Exercise/activity: no    Main bariatric nutrition principles discussed and explained. Patient needs to focus on 100g protein daily, 100-140g carbohydrates daily, healthy fat intake, 64 oz fluid daily, no carbonation, and try protein drinks/protein powders. Avoid high fructose corn syrup. Patient verbalized understanding and queries were answered.  Additional nutritional counseling will be available      Chica Carroll RD,[place]

## 2022-09-14 ENCOUNTER — HOSPITAL ENCOUNTER (OUTPATIENT)
Dept: PULMONOLOGY | Facility: HOSPITAL | Age: 37
Discharge: HOME | End: 2022-09-14
Admitting: PHYSICIAN ASSISTANT

## 2022-09-14 DIAGNOSIS — R06.09 DYSPNEA ON EXERTION: ICD-10-CM

## 2022-09-14 PROCEDURE — 94726 PLETHYSMOGRAPHY LUNG VOLUMES: CPT | Performed by: INTERNAL MEDICINE

## 2022-09-14 PROCEDURE — 94729 DIFFUSING CAPACITY: CPT

## 2022-09-14 PROCEDURE — 94729 DIFFUSING CAPACITY: CPT | Performed by: INTERNAL MEDICINE

## 2022-09-14 PROCEDURE — 94060 EVALUATION OF WHEEZING: CPT | Performed by: INTERNAL MEDICINE

## 2022-09-14 PROCEDURE — 94060 EVALUATION OF WHEEZING: CPT

## 2022-09-14 PROCEDURE — 94726 PLETHYSMOGRAPHY LUNG VOLUMES: CPT

## 2022-09-14 PROCEDURE — 94640 AIRWAY INHALATION TREATMENT: CPT

## 2022-09-14 RX ORDER — ALBUTEROL SULFATE 90 UG/1
4 AEROSOL, METERED RESPIRATORY (INHALATION) ONCE
Status: COMPLETED | OUTPATIENT
Start: 2022-09-14 | End: 2022-09-14

## 2022-09-14 RX ADMIN — ALBUTEROL SULFATE 4 PUFF: 90 AEROSOL, METERED RESPIRATORY (INHALATION) at 10:32

## 2022-09-21 DIAGNOSIS — R94.2 ABNORMAL PFTS: Primary | ICD-10-CM

## 2022-11-09 ENCOUNTER — OFFICE VISIT (OUTPATIENT)
Dept: PULMONOLOGY | Facility: CLINIC | Age: 37
End: 2022-11-09

## 2022-11-09 VITALS
HEIGHT: 62 IN | HEART RATE: 98 BPM | DIASTOLIC BLOOD PRESSURE: 80 MMHG | WEIGHT: 293 LBS | OXYGEN SATURATION: 97 % | SYSTOLIC BLOOD PRESSURE: 128 MMHG | RESPIRATION RATE: 18 BRPM | BODY MASS INDEX: 53.92 KG/M2

## 2022-11-09 DIAGNOSIS — G47.33 OBSTRUCTIVE SLEEP APNEA: Primary | ICD-10-CM

## 2022-11-09 DIAGNOSIS — E66.01 MORBID OBESITY, UNSPECIFIED OBESITY TYPE: ICD-10-CM

## 2022-11-09 DIAGNOSIS — Z01.811 PREOP PULMONARY/RESPIRATORY EXAM: ICD-10-CM

## 2022-11-09 PROCEDURE — 99214 OFFICE O/P EST MOD 30 MIN: CPT | Performed by: NURSE PRACTITIONER

## 2022-11-09 NOTE — PROGRESS NOTES
"Chief Complaint   Patient presents with   • Sleeping Problem   • Follow-up         Subjective   Lynda Biswas is a 37 y.o. female.     History of Present Illness   Patient comes back today for follow up of Obstructive Sleep apnea.     Patient says that she is compliant with her device and using it regularly.    Patient's symptoms of sleep disturbance and daytime sleepiness have been helped greatly with the use of PAP device, as prescribed.     She was positive for covid 2-3 weeks ago and continues to have a dry cough and fatigue, but is feeling better.     She continues to have some SOB but it has not worsened.     She uses albuterol twice a week on average.    She is hoping to have bariatric surgery however this has taken a while to get all of the clearance and approval from the insurance.  Apparently bariatrics is still requesting pulmonary clearance.    The following portions of the patient's history were reviewed and updated as appropriate: allergies, current medications, past family history, past medical history, past social history and past surgical history.    Review of Systems   HENT: Negative for sneezing.    Respiratory: Positive for shortness of breath.    Psychiatric/Behavioral: Positive for sleep disturbance.       Objective   Visit Vitals  /80   Pulse 98   Resp 18   Ht 157.5 cm (62\")   Wt (!) 162 kg (357 lb)   SpO2 97%   BMI 65.30 kg/m²         Physical Exam  Vitals reviewed.   Constitutional:       Appearance: She is well-developed.   HENT:      Head: Atraumatic.      Mouth/Throat:      Mouth: Mucous membranes are moist.      Comments: Crowded oropharynx. Lip piercing.  Eyes:      Extraocular Movements: Extraocular movements intact.   Neck:      Comments: Increased adipose tissue.  Pulmonary:      Comments: Somewhat decrease A/E without wheezing.   Abdominal:      Comments: Obese abdomen.    Musculoskeletal:      Comments: Gait was slow.   Skin:     General: Skin is warm.   Neurological: "      Mental Status: She is alert and oriented to person, place, and time.         Assessment & Plan   Diagnoses and all orders for this visit:    1. Obstructive sleep apnea (Primary)    2. Morbid obesity, unspecified obesity type (HCC)    3. Preop pulmonary/respiratory exam           Return in about 8 months (around 7/9/2023) for Recheck, For Dr. Hartman, Sleep ONLY.    DISCUSSION (if any):  I reviewed the results of last sleep study in detail. I informed her that the apnea hypopnea index was 19 per hour. This was home study, performed in 2016.    Continue treatment with AutoPAP at a pressure of 8/20, with a nasal mask.    Patient's compliance data was reviewed and the compliance is greater than 80%.    Humidification setup, hose and mask care discussed.    Weight loss advised.    Use every night for at least 4 hours stressed.     In looking at bariatric surgery note from August, it mentions they are still waiting on pulmonary clearance.  Pulmonary clearance was actually given in November 2021 along with a full PFT.  It does appear that spirometry was recently completed in September which was ordered by bariatrics.    PFT from September 2022 is consistent with no obstruction.  There is moderate restriction without suggestion of air trapping. Preserved diffusion capacity.    Restriction is likely from obstructive sleep apnea and truncal obesity.    Pulmonary clearance will again be provided today.  The risk has not changed since November 2021.    The patient states she has not used nicotine in any form including vape for at least 6 months.    She is very compliant with CPAP therapy.  I have suggested she take her machine with her the day of surgery in case she needs to stay overnight.    From Pulmonary stand point, her risk for the proposed procedure appears to be.                 Low      Postoperative recommendations:            * Out of bed to chair, as soon as feasible.            * Albuterol & Atrovent nebulizers  Q4hr PRN            * Incentive spirometry every hour.            * Minimize the use of NG tube.            * Keep O2sat at 88% or more, with minimum supplemental O2.            * Minimize anesthesia time, as much as possible            * Inform anesthesiologist of her diagnosis of obstructive sleep apnea            * Consider ABG, preop and postop, if necessary.            * Consider using PAP device, if indicated post op.     Pulmonary risk stratification needs to be discussed with the physician performing the procedure and, apart from procedures involving pulmonary resection, should not be used alone to determine patient's appropriateness for the planned procedure.       Dictated utilizing Dragon dictation.    This document was electronically signed by OMARI Valdes November 9, 2022  15:01 EST

## 2022-12-16 DIAGNOSIS — R10.13 DYSPEPSIA: Primary | ICD-10-CM

## 2022-12-20 ENCOUNTER — LAB (OUTPATIENT)
Dept: LAB | Facility: HOSPITAL | Age: 37
End: 2022-12-20

## 2022-12-20 DIAGNOSIS — R10.13 DYSPEPSIA: ICD-10-CM

## 2023-01-16 ENCOUNTER — TELEPHONE (OUTPATIENT)
Dept: BARIATRICS/WEIGHT MGMT | Facility: CLINIC | Age: 38
End: 2023-01-16
Payer: COMMERCIAL

## 2023-01-16 NOTE — TELEPHONE ENCOUNTER
Patient called. She states she has to have an updated Hypylori test and she states that she has been having stomach issues.    She would like a CB from the nurses in regards to this.    Please advise.

## 2023-02-15 DIAGNOSIS — R10.13 DYSPEPSIA: Primary | ICD-10-CM

## 2025-02-11 ENCOUNTER — TELEPHONE (OUTPATIENT)
Dept: PULMONOLOGY | Facility: CLINIC | Age: 40
End: 2025-02-11
Payer: COMMERCIAL

## 2025-02-11 NOTE — TELEPHONE ENCOUNTER
"     Caller: Lynda Biswas \"Lynda or CAITLYN\"    Relationship: Self      Date of last appointment: 11/09/2022    Issues/Supplies requested: PT IS SAYING SHE NEEDS A NEW MACHINE AND EVERYTHING THAT GOES WITH IT    Type of mask (fill or nasal): NASAL    Does equipment use a modem or chip: CHIP    Ordering physician's name: DR. GLASS    Patient contact information: [phone]    Fax number where the order should be sent: [phone]       "

## 2025-02-20 ENCOUNTER — OFFICE VISIT (OUTPATIENT)
Dept: PULMONOLOGY | Facility: CLINIC | Age: 40
End: 2025-02-20
Payer: COMMERCIAL

## 2025-02-20 VITALS
SYSTOLIC BLOOD PRESSURE: 146 MMHG | HEART RATE: 98 BPM | HEIGHT: 62 IN | RESPIRATION RATE: 18 BRPM | WEIGHT: 293 LBS | BODY MASS INDEX: 53.92 KG/M2 | OXYGEN SATURATION: 98 % | DIASTOLIC BLOOD PRESSURE: 86 MMHG

## 2025-02-20 DIAGNOSIS — G47.33 OBSTRUCTIVE SLEEP APNEA: ICD-10-CM

## 2025-02-20 DIAGNOSIS — J45.20 MILD INTERMITTENT ASTHMA WITHOUT COMPLICATION: Primary | ICD-10-CM

## 2025-02-20 PROCEDURE — 3079F DIAST BP 80-89 MM HG: CPT | Performed by: INTERNAL MEDICINE

## 2025-02-20 PROCEDURE — 99214 OFFICE O/P EST MOD 30 MIN: CPT | Performed by: INTERNAL MEDICINE

## 2025-02-20 PROCEDURE — 3077F SYST BP >= 140 MM HG: CPT | Performed by: INTERNAL MEDICINE

## 2025-02-20 RX ORDER — PANTOPRAZOLE SODIUM 40 MG/1
40 TABLET, DELAYED RELEASE ORAL DAILY
COMMUNITY

## 2025-02-20 NOTE — PROGRESS NOTES
"  Chief Complaint   Patient presents with    Sleeping Problem    Follow-up         Subjective   Lynda Biswas is a 39 y.o. female.   Last seen in Nov 2022    Patient was evaluated today for follow up of Sleep apnea and ?asthma.     Patient doesn't report any issues with the PAP device. The patient describes no significant issues with her mask either.     Patient says that the compliance with the use of the equipment is good.     Patient says that her symptoms of fatigue & daytime sleepiness have been helped greatly with the use of PAP, as prescribed.     She has had recent issues with shortness of breath and went to ER and was told that she had flu.        The following portions of the patient's history were reviewed and updated as appropriate: allergies, current medications, past family history, past medical history, past social history, and past surgical history.    Review of Systems   HENT:  Negative for sinus pressure, sneezing and sore throat.    Respiratory:  Positive for cough. Negative for chest tightness, shortness of breath and wheezing.    Psychiatric/Behavioral:  Positive for sleep disturbance.        Objective   Visit Vitals  /86   Pulse 98   Resp 18   Ht 157.5 cm (62\") Comment: pt reported   Wt (!) 159 kg (350 lb)   SpO2 98%   BMI 64.02 kg/m²       BMI Readings from Last 8 Encounters:   02/20/25 64.02 kg/m²   11/09/22 65.30 kg/m²   08/31/22 65.39 kg/m²   07/06/22 67.23 kg/m²   01/06/22 67.23 kg/m²   11/24/21 65.66 kg/m²   08/18/21 67.78 kg/m²   08/12/21 67.13 kg/m²       Physical Exam  Vitals reviewed.   Constitutional:       Appearance: She is well-developed.   HENT:      Head: Normocephalic and atraumatic.      Mouth/Throat:      Comments: Oropharynx was crowded.  Eyes:      Extraocular Movements: Extraocular movements intact.   Cardiovascular:      Rate and Rhythm: Normal rate.   Pulmonary:      Comments: Somewhat hyperresonant to percussion.  Somewhat decreased air entry.  No obvious " wheezing noted.   Musculoskeletal:      Cervical back: Neck supple.   Neurological:      Mental Status: She is alert and oriented to person, place, and time.             Assessment & Plan   Diagnoses and all orders for this visit:    1. Mild intermittent asthma without complication (Primary)  -     Spirometry With Bronchodilator; Future  -     Nitric Oxide; Future    2. Obstructive sleep apnea  -     BIPAP / CPAP Adjustment  -     BIPAP / CPAP Adjustment    3. BMI 60.0-69.9, adult           Return in about 6 months (around 8/20/2025) for Recheck, Spirometry F/U, FeNO F/U, For Kayley Ledbetter).    DISCUSSION (if any):  Laboratory workup also showed   Lab Results   Component Value Date    HGB 12.5 10/23/2020    HGB 13.0 09/12/2017    HGB 12.5 04/23/2017   ,   Lab Results   Component Value Date    HCT 39.4 10/23/2020    HCT 43.3 09/12/2017    HCT 39.7 04/23/2017       Lab Results   Component Value Date    EOSABS 0.33 10/23/2020    EOSABS 0.39 04/23/2017    EOSABS 0.17 04/21/2017      Laboratory workup also showed   Lab Results   Component Value Date    CO2 28.9 10/23/2020   ,   Lab Results   Component Value Date    HGBA1C 6.94 (H) 10/23/2020    HGBA1C 6.6 (H) 04/18/2017   ,   Lab Results   Component Value Date    TSH 1.270 07/06/2022    TSH 1.020 10/23/2020     Laboratory workup also showed   Lab Results   Component Value Date    PHART 7.431 04/18/2017    CQJ2BSL 33.5 (L) 04/18/2017    PO2ART 104.0 (H) 04/18/2017    HGBBG 13.3 04/18/2017    H0XSLIRB 98.7 04/18/2017     ===========================  ===========================    It appears that her symptoms maybe consistent with mild asthma     Patient was advised to use rescue inhaler for when necessary purposes    Patient was also advised to keep a log of the use of rescue inhaler.      Compliance with medications stressed.     Side effects of prescribed medications discussed with the patient.    The need to continue to be aware of triggers that may cause asthma  exacerbation versus progression of disease, was also discussed.    Sleep study performed in Aug 2016  AHI was 19 / hour.       Latest PAP device provided in Oct 2020  DME company: [employer]    Current PAP settings: 8-20  Current mask type: Nasal pillow    I told the patient that her symptoms are consistent with partially controlled sleep apnea.    I have decided to empirically change her to AutoPAP at a pressure of 11-20.    If the symptoms do not improve, even after undertaking the above measures, the patient may have to undergo a titration study.    Patient was advised to continue using PAP for at least 4 hours every night.    Patient was advised to call this office with any issues.      Dictated utilizing Dragon dictation.    This document was electronically signed by Alexa Hartman MD on 02/20/25 at 10:56 EST

## 2025-03-07 ENCOUNTER — OFFICE VISIT (OUTPATIENT)
Dept: OBSTETRICS AND GYNECOLOGY | Facility: CLINIC | Age: 40
End: 2025-03-07
Payer: COMMERCIAL

## 2025-03-07 VITALS
BODY MASS INDEX: 53.92 KG/M2 | WEIGHT: 293 LBS | DIASTOLIC BLOOD PRESSURE: 80 MMHG | SYSTOLIC BLOOD PRESSURE: 132 MMHG | HEIGHT: 62 IN

## 2025-03-07 DIAGNOSIS — N89.8 VAGINAL ITCHING: Primary | ICD-10-CM

## 2025-03-07 PROCEDURE — 1160F RVW MEDS BY RX/DR IN RCRD: CPT | Performed by: MIDWIFE

## 2025-03-07 PROCEDURE — 3079F DIAST BP 80-89 MM HG: CPT | Performed by: MIDWIFE

## 2025-03-07 PROCEDURE — 99213 OFFICE O/P EST LOW 20 MIN: CPT | Performed by: MIDWIFE

## 2025-03-07 PROCEDURE — 3075F SYST BP GE 130 - 139MM HG: CPT | Performed by: MIDWIFE

## 2025-03-07 PROCEDURE — 1159F MED LIST DOCD IN RCRD: CPT | Performed by: MIDWIFE

## 2025-03-07 NOTE — PROGRESS NOTES
Chief Complaint   Patient presents with    Vaginitis     Vaginal cramping/swelling       Lynda Biswas is a 39 y.o. year old  presenting to be seen for vaginal itching. She states it has resolved since she had her period  She just finished her period. She c/o sloughing of tissue that she has noticed when she wipes. She hasn't had a lot of vaginal discharge.  She has monthly menses.  She has a large abdominal hernia which she states is from her gallbladder being removed. She has to lose weight before it can be surgically repaired.    History  Past Medical History:   Diagnosis Date    Abdominal pain     r/t incisional hernia    Abnormal Pap smear of cervix     Anxiety     Arthritis     Bladder spasms     Body piercing     EARS AND LEFT NIPPLE    Depression     Diabetes     TAKES ORAL MED, NO INSULIN    Dyspepsia     Dyspnea on exertion     Fatigue     Fatty liver     Gastroparesis     GERD (gastroesophageal reflux disease)     episodic, prn Pepto, denies prior eval     Hard of hearing     RIGHT SIDE    Hard to intubate     Difficult intubation in 2017 with cholecystectomy.  Required prolonged ventilator support post-operatively.  Was UNABLE to be intubated at [facility] for hernia repair 2017. Procedure aborted due to difficulty of the airway.     Hearing loss     PATIENT REPORTS VERY MILD AND THAT SHE DOES NOT USE HEARING DEVICES    HPV (human papilloma virus) infection     Hypertension     Incisional hernia     r/t open melissa, needing repair    Joint pain     mostly knees, daily Ibuprofen, no steroids    Migraine     Morbid obesity with BMI of 60.0-69.9, adult     MRSA (methicillin resistant Staphylococcus aureus)     recently, (L) breast/abdomen, treated w/ Bactrim     Nausea     postprandial, worse since having gallbladder removed, denies eval    PEDRO on CPAP     compliant    PCOS (polycystic ovarian syndrome)     unable to tolerate Metformin, on OCPs    Peripheral edema     prn Lasix 20mg, no KCl    PMS  "(premenstrual syndrome)     Polycystic ovary syndrome     Psoriasis     Tattoo     X1    Urinary tract infection     Varicella     Wears glasses    , Allergies:  Morphine    Social History    Tobacco Use      Smoking status: Former        Packs/day: 0.00        Types: Cigarettes        Start date:         Quit date: 2019        Years since quittin.1      Smokeless tobacco: Never      Tobacco comments: very occassional smoker in the past      Review of Systems  Pertinent items are noted in HPI, all other systems reviewed and negative       Objective   /80   Ht 157.5 cm (62\")   Wt (!) 159 kg (350 lb)   BMI 64.02 kg/m²     Physical Exam:  General Appearance: alert, appears stated age, and cooperative  Lungs: respirations regular, respirations even, and respirations unlabored  Abdomen: soft non-tender and large mass right side of abdomen  Pelvic: Clinical staff was present for exam  External genitalia:  normal appearance of the external genitalia including Bartholin's and [place]grove's glands.  Vaginal:  normal pink mucosa without prolapse or lesions. blood present -  moderate amount and dark red;  Cervix:  normal appearance. blood is seen coming from external cervical os;  Extremities: moves extremities well, no edema, no cyanosis, and no redness  Skin: no bleeding, bruising or rash and no lesions noted  Neurologic: Mental Status orientated to person, place, time and situation, Speech normal content and execusion    Lab Review   No data reviewed    Imaging   No data reviewed         Assessment /Plan    Diagnoses and all orders for this visit:    1. Vaginal itching (Primary)  -     NuSwab VG+ - Swab, Cervix        No orders of the defined types were placed in this encounter.    Follow up PRN           This note was electronically signed.  Aishwarya Medina CNM  3/7/2025    "

## 2025-03-12 LAB
A VAGINAE DNA VAG QL NAA+PROBE: NORMAL SCORE
BVAB2 DNA VAG QL NAA+PROBE: NORMAL SCORE
C ALBICANS DNA VAG QL NAA+PROBE: NEGATIVE
C GLABRATA DNA VAG QL NAA+PROBE: NEGATIVE
C TRACH DNA SPEC QL NAA+PROBE: NEGATIVE
MEGA1 DNA VAG QL NAA+PROBE: NORMAL SCORE
N GONORRHOEA DNA VAG QL NAA+PROBE: NEGATIVE
T VAGINALIS DNA VAG QL NAA+PROBE: NEGATIVE

## 2025-04-28 ENCOUNTER — TELEPHONE (OUTPATIENT)
Dept: PULMONOLOGY | Facility: CLINIC | Age: 40
End: 2025-04-28

## 2025-04-28 NOTE — TELEPHONE ENCOUNTER
"    Caller: yLnda Biswas \"Lynda BRADY\"    Relationship to Patient: Self    Phone Number: [phone]     Reason for Call: THE PT NEEDS SURGICAL CLEARANCE FOR HER WEIGHT LOSS SURGERY. PLEASE ADVISE.     When was the patient last seen: 02/20/25    "

## 2025-04-29 ENCOUNTER — DOCUMENTATION (OUTPATIENT)
Dept: PULMONOLOGY | Facility: CLINIC | Age: 40
End: 2025-04-29
Payer: COMMERCIAL

## 2025-04-29 NOTE — PROGRESS NOTES
From Pulmonary stand point, her risk for the proposed procedure appears to be.     Moderate     Postoperative recommendations:            * Out of bed to chair, as soon as feasible.            * Albuterol & Atrovent nebulizers Q4hr PRN            * Incentive spirometry every hour.            * Minimize the use of NG tube.            * Keep O2sat at 88% or more, with minimum supplemental O2.            * Minimize anesthesia time, as much as possible            * Inform anesthesiologist of her diagnosis of obstructive sleep apnea            * Consider ABG, preop and postop, if necessary.            * Consider using PAP device, if indicated post op.    Pulmonary risk stratification needs to be discussed with the physician performing the procedure and, apart from procedures involving pulmonary resection, should not be used alone to determine patient's appropriateness for the planned procedure.    This document was electronically signed by Alexa Hartman MD on 04/29/25 at 16:41 EDT

## 2025-04-30 ENCOUNTER — TELEPHONE (OUTPATIENT)
Dept: PULMONOLOGY | Facility: CLINIC | Age: 40
End: 2025-04-30
Payer: COMMERCIAL

## 2025-04-30 NOTE — TELEPHONE ENCOUNTER
"  Caller: Lynda Biswas \"Lynda BRADY\"    Relationship to patient: Self    Best call back number: [phone]    Patient is needing: PT SAID SHE SPOKE TO SOMEONE YESTERDAY ABOUT SENDING SURGERY CLEARANCE APPROVAL TO DR. JOHNSON FOR WEIGHT LOSS SURGERY.   THEY NEEDED A FAX #.  PLEASE SEE BELOW.    DR. JOHNSON / FAX# [phone]    "

## 2025-06-02 NOTE — ANESTHESIA POSTPROCEDURE EVALUATION
Patient: Lynda Biswas    Procedure Summary     Date Anesthesia Start Anesthesia Stop Room / Location    04/17/17 1892 [id number] OR 3 /  [facility]       Procedure Diagnosis Surgeon Provider    CHOLECYSTECTOMY LAPAROSCOPIC CONVERTED TO OPEN WITH INTRAOPERATIVE CHOLEANGIOGRAM (N/A Abdomen) Gallstones  (Gallstones [K80.20]) MD Valente Wilkes CRNA          Anesthesia Type: general  Last vitals  BP      Temp      Pulse     Resp      SpO2        Post Anesthesia Care and Evaluation    Patient location during evaluation: PACU  Patient participation: complete - patient cannot participate  Pain score: 0  Pain management: adequate  PONV Status: none  Cardiovascular status: acceptable  Respiratory status: ETT and ventilator  Hydration status: acceptable    Comments: Pt remained intubated and sedated to PACU placed on vent per resp vss sedation to be orderped per surgeon for post op peroid.       none

## 2025-08-20 ENCOUNTER — OFFICE VISIT (OUTPATIENT)
Dept: PULMONOLOGY | Facility: CLINIC | Age: 40
End: 2025-08-20
Payer: COMMERCIAL

## 2025-08-20 VITALS
WEIGHT: 293 LBS | HEIGHT: 62 IN | DIASTOLIC BLOOD PRESSURE: 74 MMHG | RESPIRATION RATE: 18 BRPM | SYSTOLIC BLOOD PRESSURE: 128 MMHG | OXYGEN SATURATION: 98 % | BODY MASS INDEX: 53.92 KG/M2 | HEART RATE: 82 BPM

## 2025-08-20 DIAGNOSIS — Z01.811 PREOPERATIVE RESPIRATORY EXAMINATION: ICD-10-CM

## 2025-08-20 DIAGNOSIS — J45.20 MILD INTERMITTENT ASTHMA WITHOUT COMPLICATION: ICD-10-CM

## 2025-08-20 DIAGNOSIS — G47.33 OBSTRUCTIVE SLEEP APNEA: ICD-10-CM

## 2025-08-20 DIAGNOSIS — E66.01 MORBID OBESITY WITH BMI OF 50.0-59.9, ADULT: ICD-10-CM

## 2025-08-20 DIAGNOSIS — J45.20 MILD INTERMITTENT ASTHMA WITHOUT COMPLICATION: Primary | ICD-10-CM

## 2025-08-20 RX ORDER — ALBUTEROL SULFATE 90 UG/1
2 AEROSOL, METERED RESPIRATORY (INHALATION) EVERY 6 HOURS PRN
Qty: 18 G | Refills: 3 | Status: SHIPPED | OUTPATIENT
Start: 2025-08-20

## 2025-08-20 RX ORDER — SEMAGLUTIDE 0.68 MG/ML
INJECTION, SOLUTION SUBCUTANEOUS
COMMUNITY
Start: 2025-03-18

## (undated) DEVICE — ENDOPATH XCEL BLADELESS TROCARS WITH STABILITY SLEEVES: Brand: ENDOPATH XCEL

## (undated) DEVICE — DRN WND HUBLSS FLUT FULL PERF SIL10MM

## (undated) DEVICE — PROXIMATE SKIN STAPLERS (35 WIDE) CONTAINS 35 STAINLESS STEEL STAPLES (FIXED HEAD): Brand: PROXIMATE

## (undated) DEVICE — NDL HYPO ECLPS SFTY 22G 1 1/2IN

## (undated) DEVICE — SPNG GZ WOVN 4X4IN 12PLY 10/BX STRL

## (undated) DEVICE — SUT VICRYL 3/0 CT1 27IN J258H

## (undated) DEVICE — SYR LUERLOK 20CC

## (undated) DEVICE — WAND INSULSCAN BX50 STRL

## (undated) DEVICE — SUT PDS 0 CT 36IN DYED Z358T

## (undated) DEVICE — CUFF SCD HEMOFORCE SEQ CALF STD MD

## (undated) DEVICE — TP SXN YANKR BULB STRL

## (undated) DEVICE — SUT SILK 2/0 SH 30IN K833H

## (undated) DEVICE — ADHS LIQ MASTISOL 2/3ML

## (undated) DEVICE — SPNG GZ STRL 2S 4X4 12PLY

## (undated) DEVICE — SUT SILK 0 TIES 30IN A306H

## (undated) DEVICE — SUT SILK 2/0 SUTUPAK TIES 24IN SA75H

## (undated) DEVICE — GLV SURG ULTRATOUCH BIOGEL/COAT PF LF SZ6 STRL

## (undated) DEVICE — PENCL E/S HNDSWCH PUSHBTN HOLSTR 10FT

## (undated) DEVICE — GLV SURG SENSICARE GREEN W/ALOE PF LF 6 STRL

## (undated) DEVICE — TP ELECTRD LAP L WR SPLIT33CM

## (undated) DEVICE — DRSNG WND GZ PAD BORDERED LF 4X5IN STRL

## (undated) DEVICE — SUT VIC 0/0 UR6 27IN DYED J603H

## (undated) DEVICE — DRSNG SURESITE WNDW 4X4.5

## (undated) DEVICE — STRIP,CLOSURE,WOUND,MEDI-STRIP,1/2X4: Brand: MEDLINE

## (undated) DEVICE — 3M™ TEGADERM™ TRANSPARENT FILM DRESSING, 1626W, 4 IN X 4-3/4 IN (10 CM X 12 CM), 50 EACH/CARTON, 4 CARTON/CASE: Brand: 3M™ TEGADERM™

## (undated) DEVICE — TOTAL TRAY, 16FR 10ML SIL FOLEY, URN: Brand: MEDLINE

## (undated) DEVICE — KT CATH CHOLANGIOGRA PERC W/BALLO

## (undated) DEVICE — DISPOSABLE MONOPOLAR ENDOSCOPIC CORD 10 FT. (3M): Brand: KIRWAN

## (undated) DEVICE — ELECTRODE, PTFE, BLADE 6': Brand: MEDLINE

## (undated) DEVICE — 2, DISPOSABLE SUCTION/IRRIGATOR WITHOUT DISPOSABLE TIP: Brand: STRYKEFLOW

## (undated) DEVICE — SUT PROLN 0/0 CTX 30IN 8454H

## (undated) DEVICE — UNDYED BRAIDED (POLYGLACTIN 910), SYNTHETIC ABSORBABLE SUTURE: Brand: COATED VICRYL

## (undated) DEVICE — SYR LUERLOK 30CC

## (undated) DEVICE — SUT PROLN 0 MO6 30IN 8418H

## (undated) DEVICE — RICH MAJOR PROCEDURE: Brand: MEDLINE INDUSTRIES, INC.

## (undated) DEVICE — RICH GENERAL LAPAROSCOPY: Brand: MEDLINE INDUSTRIES, INC.

## (undated) DEVICE — RESERVOIR,SUCTION,100CC,SILICONE: Brand: MEDLINE

## (undated) DEVICE — ENDOPOUCH RETRIEVER SPECIMEN RETRIEVAL BAGS: Brand: ENDOPOUCH RETRIEVER

## (undated) DEVICE — SUT PDS 0 CT2 27IN DYED Z334H

## (undated) DEVICE — ENDOPATH XCEL UNIVERSAL TROCAR STABLILITY SLEEVES: Brand: ENDOPATH XCEL

## (undated) DEVICE — PDS II VLT 0 107CM AG ST3: Brand: ENDOLOOP

## (undated) DEVICE — DRSNG WND BORDR/ADHS NONADHR/GZ LF 4X10IN STRL

## (undated) DEVICE — MONOPOLAR METZENBAUM SCISSOR, MINI BLADE TIP, DISPOSABLE: Brand: MONOPOLAR METZENBAUM SCISSOR, MINI BLADE TIP, DISPOSABLE

## (undated) DEVICE — STRIP SKIN CLOSEURE PROXI 1X5IN

## (undated) DEVICE — ENDOPATH XCEL BLUNT TIP TROCARS WITH SMOOTH SLEEVES: Brand: ENDOPATH XCEL

## (undated) DEVICE — SOL NACL 0.9PCT 1000ML